# Patient Record
Sex: MALE | Race: WHITE | NOT HISPANIC OR LATINO | Employment: PART TIME | URBAN - METROPOLITAN AREA
[De-identification: names, ages, dates, MRNs, and addresses within clinical notes are randomized per-mention and may not be internally consistent; named-entity substitution may affect disease eponyms.]

---
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
## 2021-01-08 ENCOUNTER — TELEPHONE (OUTPATIENT)
Dept: PSYCHIATRY | Facility: CLINIC | Age: 21
End: 2021-01-08

## 2021-01-11 ENCOUNTER — TELEPHONE (OUTPATIENT)
Dept: PSYCHIATRY | Facility: CLINIC | Age: 21
End: 2021-01-11

## 2021-01-11 NOTE — TELEPHONE ENCOUNTER
Yvon Ochoa can you please reach out to patient for scheduling at [facility] location

## 2021-05-12 ENCOUNTER — TELEMEDICINE (OUTPATIENT)
Dept: FAMILY MEDICINE CLINIC | Facility: CLINIC | Age: 21
End: 2021-05-12
Payer: COMMERCIAL

## 2021-05-12 VITALS — BODY MASS INDEX: 41.84 KG/M2 | TEMPERATURE: 98.1 F | WEIGHT: 300 LBS

## 2021-05-12 DIAGNOSIS — Z20.822 ENCOUNTER BY TELEHEALTH FOR SUSPECTED COVID-19: ICD-10-CM

## 2021-05-12 DIAGNOSIS — Z20.822 ENCOUNTER BY TELEHEALTH FOR SUSPECTED COVID-19: Primary | ICD-10-CM

## 2021-05-12 LAB — SARS-COV-2 RNA RESP QL NAA+PROBE: NEGATIVE

## 2021-05-12 PROCEDURE — 3008F BODY MASS INDEX DOCD: CPT | Performed by: FAMILY MEDICINE

## 2021-05-12 PROCEDURE — U0005 INFEC AGEN DETEC AMPLI PROBE: HCPCS | Performed by: NURSE PRACTITIONER

## 2021-05-12 PROCEDURE — U0003 INFECTIOUS AGENT DETECTION BY NUCLEIC ACID (DNA OR RNA); SEVERE ACUTE RESPIRATORY SYNDROME CORONAVIRUS 2 (SARS-COV-2) (CORONAVIRUS DISEASE [COVID-19]), AMPLIFIED PROBE TECHNIQUE, MAKING USE OF HIGH THROUGHPUT TECHNOLOGIES AS DESCRIBED BY CMS-2020-01-R: HCPCS | Performed by: NURSE PRACTITIONER

## 2021-05-12 PROCEDURE — 99213 OFFICE O/P EST LOW 20 MIN: CPT | Performed by: NURSE PRACTITIONER

## 2021-05-12 PROCEDURE — 3725F SCREEN DEPRESSION PERFORMED: CPT | Performed by: NURSE PRACTITIONER

## 2021-05-12 NOTE — PROGRESS NOTES
COVID-19 Outpatient Progress Note    Assessment/Plan:    Problem List Items Addressed This Visit        Other    Encounter by telehealth for suspected COVID-19 - Primary     12May:  COVID testing ordered  Will follow-up with patient pending results of COVID test          Relevant Orders    Novel Coronavirus (COVID-19), PCR [facility] Collected at   [address] [address] or [facility]         Disposition:     I referred patient to one of our centralized sites for a COVID-19 swab  I have spent 15 minutes directly with the patient  Encounter provider HIREN Howell    Provider located at [address] [place] [address] PRIMARY CARE  [facility] [id number]    Recent Visits  No visits were found meeting these conditions  Showing recent visits within past 7 days and meeting all other requirements     Today's Visits  Date Type Provider Dept   05/12/21 Telemedicine Fanny Hallman 42 Primary Care   Showing today's visits and meeting all other requirements     Future Appointments  No visits were found meeting these conditions  Showing future appointments within next 150 days and meeting all other requirements      This virtual check-in was done via Lookery and patient was informed that this is a secure, HIPAA-compliant platform  He agrees to proceed  Patient agrees to participate in a virtual check in via telephone or video visit instead of presenting to the office to address urgent/immediate medical needs  Patient is aware this is a billable service  After connecting through [facility], the patient was identified by name and date of birth  Inmanmanjula Hernandez was informed that this was a telemedicine visit and that the exam was being conducted confidentially over secure lines  My office door was closed  No one else was in the room  Inman Mary acknowledged consent and understanding of privacy and security of the telemedicine visit   I informed the patient that I have reviewed his record in [address] [place] [address] and presented the opportunity for him to ask any questions regarding the visit today  The patient agreed to participate  Subjective:   Yazmin Tavares is a 24 y o  male who is concerned about COVID-19  Patient's symptoms include fatigue, malaise, nasal congestion and myalgias (shoulders and arms )  Patient denies fever, chills, rhinorrhea, sore throat, anosmia, loss of taste, cough, shortness of breath, chest tightness, abdominal pain, nausea, vomiting, diarrhea and headaches  Date of symptom onset: 5/9/2021    Exposure:   Contact with a person who is under investigation (PUI) for or who is positive for COVID-19 within the last 14 days?: No    Hospitalized recently for fever and/or lower respiratory symptoms?: No      Currently a healthcare worker that is involved in direct patient care?: No      Works in a special setting where the risk of COVID-19 transmission may be high? (this may include long-term care, correctional and long term facilities; homeless shelters; assisted-living facilities and group homes ): No      Resident in a special setting where the risk of COVID-19 transmission may be high? (this may include long-term care, correctional and long term facilities; homeless shelters; assisted-living facilities and group homes ): No      The patient is reporting symptoms of nasal congestion, myalgias, and increased fatigue  The patient states that he was sent home from work today due to his symptoms and his job would like him to be COVID tested before returning to work  Patient denies any fevers or shortness of breath  COVID testing ordered  Patient advised to continue to quarantine until he receives the results of his COVID test     Lab Results   Component Value Date    SARSCOV2 Negative 12/19/2020     History reviewed  No pertinent past medical history    Past Surgical History:   Procedure Laterality Date    WISDOM TOOTH EXTRACTION  2019     Current Outpatient Medications   Medication Sig Dispense Refill    famotidine (PEPCID) 20 mg tablet Take 1 tablet (20 mg total) by mouth daily 30 tablet 5     No current facility-administered medications for this visit  No Known Allergies    Review of Systems   Constitutional: Positive for fatigue  Negative for chills and fever  HENT: Positive for congestion  Negative for rhinorrhea and sore throat  Eyes: Negative  Respiratory: Negative for cough, chest tightness and shortness of breath  Cardiovascular: Negative  Gastrointestinal: Negative for abdominal pain, diarrhea, nausea and vomiting  Endocrine: Negative  Genitourinary: Negative  Musculoskeletal: Positive for myalgias (shoulders and arms )  Skin: Negative  Allergic/Immunologic: Negative  Neurological: Negative for headaches  Hematological: Negative  Psychiatric/Behavioral: Negative  Objective:    Vitals:    05/12/21 1054   Temp: 98 1 °F (36 7 °C)   TempSrc: Temporal   Weight: 136 kg (300 lb)       Physical Exam  Vitals reviewed: limited due to AmWell visit  Constitutional:       General: He is not in acute distress  Appearance: Normal appearance  He is not ill-appearing  Neurological:      Mental Status: He is alert  VIRTUAL VISIT DISCLAIMER    Vivek De La Cruz acknowledges that he has consented to an online visit or consultation  He understands that the online visit is based solely on information provided by him, and that, in the absence of a face-to-face physical evaluation by the physician, the diagnosis he receives is both limited and provisional in terms of accuracy and completeness  This is not intended to replace a full medical face-to-face evaluation by the physician  Vivek De La Cruz understands and accepts these terms

## 2021-05-12 NOTE — PATIENT INSTRUCTIONS
TENT COVID TESTING SITES FOR SURGICAL/PROCEDURAL PATIENTS     [facility] Location  Address  Hours   Monday-Friday Saturday Hours    [place] [address] [address], [place] [address]  8am-5pm  CLOSED    [place]  [address] [facility]  8am-5pm  CLOSED      [id number]    COVID-19 Home Care Guidelines    Your healthcare provider and/or public health staff have evaluated you and have determined that you do not need to remain in the hospital at this time  At this time you can be isolated at home where you will be monitored by staff from your local or state health department  You should carefully follow the prevention and isolation steps below until a healthcare provider or local or state health department says that you can return to your normal activities  Stay home except to get medical care    People who are mildly ill with COVID-19 are able to isolate at home during their illness  You should restrict activities outside your home, except for getting medical care  Do not go to work, school, or public areas  Avoid using public transportation, ride-sharing, or taxis  Separate yourself from other people and animals in your home    People: As much as possible, you should stay in a specific room and away from other people in your home  Also, you should use a separate bathroom, if available  Animals: You should restrict contact with pets and other animals while you are sick with COVID-19, just like you would around other people  Although there have not been reports of pets or other animals becoming sick with COVID-19, it is still recommended that people sick with COVID-19 limit contact with animals until more information is known about the virus  When possible, have another member of your household care for your animals while you are sick  If you are sick with COVID-19, avoid contact with your pet, including petting, snuggling, being kissed or licked, and sharing food   If you must care for your pet or be around animals while you are sick, wash your hands before and after you interact with pets and wear a facemask  See COVID-19 and Animals for more information  Call ahead before visiting your doctor    If you have a medical appointment, call the healthcare provider and tell them that you have or may have COVID-19  This will help the healthcare providers office take steps to keep other people from getting infected or exposed  Wear a facemask    You should wear a facemask when you are around other people (e g , sharing a room or vehicle) or pets and before you enter a healthcare providers office  If you are not able to wear a facemask (for example, because it causes trouble breathing), then people who live with you should not stay in the same room with you, or they should wear a facemask if they enter your room  Cover your coughs and sneezes    Cover your mouth and nose with a tissue when you cough or sneeze  Throw used tissues in a lined trash can  Immediately wash your hands with soap and water for at least 20 seconds or, if soap and water are not available, clean your hands with an alcohol-based hand  that contains at least 60% alcohol  Clean your hands often    Wash your hands often with soap and water for at least 20 seconds, especially after blowing your nose, coughing, or sneezing; going to the bathroom; and before eating or preparing food  If soap and water are not readily available, use an alcohol-based hand  with at least 60% alcohol, covering all surfaces of your hands and rubbing them together until they feel dry  Soap and water are the best option if hands are visibly dirty  Avoid touching your eyes, nose, and mouth with unwashed hands  Avoid sharing personal household items    You should not share dishes, drinking glasses, cups, eating utensils, towels, or bedding with other people or pets in your home   After using these items, they should be washed thoroughly with soap and water  Clean all high-touch surfaces everyday    High touch surfaces include counters, tabletops, doorknobs, bathroom fixtures, toilets, phones, keyboards, tablets, and bedside tables  Also, clean any surfaces that may have blood, stool, or body fluids on them  Use a household cleaning spray or wipe, according to the label instructions  Labels contain instructions for safe and effective use of the cleaning product including precautions you should take when applying the product, such as wearing gloves and making sure you have good ventilation during use of the product  Monitor your symptoms    Seek prompt medical attention if your illness is worsening (e g , difficulty breathing)  Before seeking care, call your healthcare provider and tell them that you have, or are being evaluated for, COVID-19  Put on a facemask before you enter the facility  These steps will help the healthcare providers office to keep other people in the office or waiting room from getting infected or exposed  Ask your healthcare provider to call the local or [facility] health department  Persons who are placed under active monitoring or facilitated self-monitoring should follow instructions provided by their local health department or occupational health professionals, as appropriate  If you have a medical emergency and need to call 911, notify the dispatch personnel that you have, or are being evaluated for COVID-19  If possible, put on a facemask before emergency medical services arrive      Discontinuing home isolation    Patients with confirmed COVID-19 should remain under home isolation precautions until the following conditions are met:   - They have had no fever for at least 24 hours (that is one full day of no fever without the use medicine that reduces fevers)  AND  - other symptoms have improved (for example, when their cough or shortness of breath have improved)  AND  - If had mild or moderate illness, at least 10 days have passed since their symptoms first appeared or if severe illness (needed oxygen) or immunosuppressed, at least 20 days have passed since symptoms first appeared  Patients with confirmed COVID-19 should also notify close contacts (including their workplace) and ask that they self-quarantine  Currently, close contact is defined as being within 6 feet for 15 minutes or more from the period 24 hours starting 48 hours before symptom onset to the time at which the patient went into isolation  Close contacts of patients diagnosed with COVID-19 should be instructed by the patient to self-quarantine for 14 days from the last time of their last contact with the patient  Source: epicurio     Problem List Items Addressed This Visit        Other    Encounter by telehealth for suspected COVID-19 - Primary     12May:  COVID testing ordered    Will follow-up with patient pending results of COVID test          Relevant Orders    Novel Coronavirus (COVID-19), PCR [facility] Collected at   [facility] [address] or [facility]

## 2021-06-01 ENCOUNTER — TELEMEDICINE (OUTPATIENT)
Dept: FAMILY MEDICINE CLINIC | Facility: CLINIC | Age: 21
End: 2021-06-01
Payer: COMMERCIAL

## 2021-06-01 VITALS — TEMPERATURE: 97.7 F | WEIGHT: 295 LBS | BODY MASS INDEX: 41.3 KG/M2 | HEIGHT: 71 IN

## 2021-06-01 DIAGNOSIS — R11.0 NAUSEA: Primary | ICD-10-CM

## 2021-06-01 PROCEDURE — 1036F TOBACCO NON-USER: CPT | Performed by: FAMILY MEDICINE

## 2021-06-01 PROCEDURE — 3008F BODY MASS INDEX DOCD: CPT | Performed by: FAMILY MEDICINE

## 2021-06-01 PROCEDURE — 99213 OFFICE O/P EST LOW 20 MIN: CPT | Performed by: FAMILY MEDICINE

## 2021-06-01 NOTE — PROGRESS NOTES
COVID-19 Outpatient Progress Note    Assessment/Plan:    Problem List Items Addressed This Visit     Nausea - Primary     Some nausea over weekend  Should have not gone to work on Saturday, but with low suspicion for COVID, OK to RTW tomorrow  Disposition:     After clarifying the patient's history, my suspicion for COVID-19 infection is very low  I have spent 15 minutes directly with the patient  Encounter provider Donovan Petersen MD    Provider located at [address] [place] [address] PRIMARY CARE  [address] [place] [address]    Recent Visits  No visits were found meeting these conditions  Showing recent visits within past 7 days and meeting all other requirements     Today's Visits  Date Type Provider Dept   06/01/21 Telemedicine Donovan Petersen MD [facility] Primary Care   Showing today's visits and meeting all other requirements     Future Appointments  No visits were found meeting these conditions  Showing future appointments within next 150 days and meeting all other requirements      This virtual check-in was done via Repairy and patient was informed that this is a secure, HIPAA-compliant platform  He agrees to proceed  Patient agrees to participate in a virtual check in via telephone or video visit instead of presenting to the office to address urgent/immediate medical needs  Patient is aware this is a billable service  After connecting through [facility], the patient was identified by name and date of birth  Sharon Martines was informed that this was a telemedicine visit and that the exam was being conducted confidentially over secure lines  My office door was closed  No one else was in the room  Sharon Martines acknowledged consent and understanding of privacy and security of the telemedicine visit  I informed the patient that I have reviewed his record in Epic and presented the opportunity for him to ask any questions regarding the visit today  The patient agreed to participate  Subjective:   Mo Boogie is a 22 y o  male who is concerned about COVID-19  Patient's symptoms include nausea and vomiting  Patient denies fever, chills, fatigue, malaise, congestion, rhinorrhea, sore throat, anosmia, loss of taste, cough, shortness of breath, chest tightness, abdominal pain, diarrhea, myalgias and headaches  Date of symptom onset: 5/29/2021    Exposure:   Contact with a person who is under investigation (PUI) for or who is positive for COVID-19 within the last 14 days?: No    Hospitalized recently for fever and/or lower respiratory symptoms?: No      Currently a healthcare worker that is involved in direct patient care?: No      Works in a special setting where the risk of COVID-19 transmission may be high? (this may include long-term care, correctional and retirement facilities; homeless shelters; assisted-living facilities and group homes ): No      Resident in a special setting where the risk of COVID-19 transmission may be high? (this may include long-term care, correctional and retirement facilities; homeless shelters; assisted-living facilities and group homes ): No      Patient seen to [facility] for N/V  He was not able to get to work due to this  Was just nausea and some vomiting  He was out Saturday due to these symptoms on Saturday  Was OK on Sunday  Lab Results   Component Value Date    SARSCOV2 Negative 05/12/2021    SARSCOV2 Negative 12/19/2020     No past medical history on file  Past Surgical History:   Procedure Laterality Date    WISDOM TOOTH EXTRACTION  2019     Current Outpatient Medications   Medication Sig Dispense Refill    famotidine (PEPCID) 20 mg tablet Take 1 tablet (20 mg total) by mouth daily 30 tablet 5     No current facility-administered medications for this visit  No Known Allergies    Review of Systems   Constitutional: Negative for chills, fatigue and fever     HENT: Negative for congestion, rhinorrhea and sore throat  Respiratory: Negative for cough, chest tightness and shortness of breath  Gastrointestinal: Positive for nausea and vomiting  Negative for abdominal pain and diarrhea  Musculoskeletal: Negative for myalgias  Neurological: Negative for headaches  Objective:    Vitals:    06/01/21 1454   Temp: 97 7 °F (36 5 °C)   TempSrc: Temporal   Weight: 134 kg (295 lb)   Height: 5' 11" (1 803 m)       Physical Exam  Nursing note reviewed  Constitutional:       Appearance: He is well-developed  HENT:      Head: Normocephalic and atraumatic  Pulmonary:      Effort: Pulmonary effort is normal       Breath sounds: Normal breath sounds  VIRTUAL VISIT DISCLAIMER    Shelby Kaiser acknowledges that he has consented to an online visit or consultation  He understands that the online visit is based solely on information provided by him, and that, in the absence of a face-to-face physical evaluation by the physician, the diagnosis he receives is both limited and provisional in terms of accuracy and completeness  This is not intended to replace a full medical face-to-face evaluation by the physician  Shelby Kaiser understands and accepts these terms

## 2021-06-01 NOTE — PATIENT INSTRUCTIONS
Problem List Items Addressed This Visit     Nausea - Primary     Some nausea over weekend  Should have not gone to work on Saturday, but with low suspicion for COVID, OK to RTW tomorrow  COVID 19 Instructions    Diego Barrett was advised to limit contact with others to essential tasks such as getting food, medications, and medical care  Proper handwashing reviewed, and Hand sanitzer when washing is not available  If the patient develops symptoms of COVID 19, the patient should call the office as soon as possible  For 3273-8353 Flu season, it is strongly recommended that Flu Vaccinations be obtained  Virtual Visits may be conducted in several ways: AmWell: You should get a text message when the provider is ready to see you  Click on the link in the text message, and the call should start  You will need to type in your name, and allow camera and microphone access  This is HIPPA compliant, and secure  Please try to download Google Duo  Once you do download this on your phone, you will be prompted to add your phone number to the account  After that, he should receive a text from [employer], and use that code to verify your phone number  After that, you should be able to use Google Duo to receive and make video calls  Please download Microsoft Teams to your phone or computer  You would get an email with the meeting after scheduling with the office  You will Join the meeting, and wait there till the provider joins as well  Instructions for downloading this are available from the office  This is HIPPA compliant, and secure  We are committed to getting you vaccinated as soon as possible and will be closely following CDC and [facility]  guidelines as they are released and revised    Please refer to our COVID-19 vaccine webpage for the most up to date information on the vaccine and our distribution elmer burris    OUR NEW LOCATION:  Starting around 28June2021, our new location and phone are:    [address] [facility] [address], [address] [place] [address], [address]  Fax: [phone]    Lab services and OB/GYN will be at this location as well

## 2021-06-01 NOTE — ASSESSMENT & PLAN NOTE
Some nausea over weekend  Should have not gone to work on Saturday, but with low suspicion for COVID, OK to RTW tomorrow

## 2021-06-01 NOTE — LETTER
June 1, 2021     Patient: Liz Blackwood   YOB: 2000   Date of Visit: 6/1/2021       To Whom it May Concern:    Kirk Tovar is under my professional care  He was seen in my office on 6/1/2021  He may return to work on [id number]  Not likely COVID, and symptoms have resolved at this time  If you have any questions or concerns, please don't hesitate to call           Sincerely,          Madison Orozco MD        CC: No Recipients

## 2021-08-11 ENCOUNTER — TELEMEDICINE (OUTPATIENT)
Dept: FAMILY MEDICINE CLINIC | Facility: CLINIC | Age: 21
End: 2021-08-11
Payer: COMMERCIAL

## 2021-08-11 VITALS — BODY MASS INDEX: 40.6 KG/M2 | TEMPERATURE: 98.4 F | WEIGHT: 290 LBS | HEIGHT: 71 IN

## 2021-08-11 DIAGNOSIS — R11.0 NAUSEA: Primary | ICD-10-CM

## 2021-08-11 PROCEDURE — 3008F BODY MASS INDEX DOCD: CPT | Performed by: FAMILY MEDICINE

## 2021-08-11 PROCEDURE — 99213 OFFICE O/P EST LOW 20 MIN: CPT | Performed by: FAMILY MEDICINE

## 2021-08-11 PROCEDURE — 1036F TOBACCO NON-USER: CPT | Performed by: FAMILY MEDICINE

## 2021-08-11 NOTE — PATIENT INSTRUCTIONS
Problem List Items Addressed This Visit     Nausea - Primary     Continues with discomfort  Can not fully eval over video  Seems OK for work as this has been a longer standing issue and he has had COVID testing before  Check US and labs  Consider GI as well  COVID 19 Instructions    Yesy Moctezuma was advised to limit contact with others to essential tasks such as getting food, medications, and medical care  Proper handwashing reviewed, and Hand sanitzer when washing is not available  If the patient develops symptoms of COVID 19, the patient should call the office as soon as possible  For 1273-5344 Flu season, it is strongly recommended that Flu Vaccinations be obtained  Virtual Visits may be conducted in several ways: Kilo: You should get a text message when the provider is ready to see you  Click on the link in the text message, and the call should start  You will need to type in your name, and allow camera and microphone access  This is HIPPA compliant, and secure  Please try to download Google Duo  Once you do download this on your phone, you will be prompted to add your phone number to the account  After that, he should receive a text from [employer], and use that code to verify your phone number  After that, you should be able to use Google Duo to receive and make video calls  Please download Microsoft Teams to your phone or computer  You would get an email with the meeting after scheduling with the office  You will Join the meeting, and wait there till the provider joins as well  Instructions for downloading this are available from the office  This is HIPPA compliant, and secure  We are committed to getting you vaccinated as soon as possible and will be closely following CDC and [facility]  guidelines as they are released and revised    Please refer to our COVID-19 vaccine webpage for the most up to date information on the vaccine and our distribution efforts  Raeann burris    OUR NEW LOCATION:  Starting around 28June2021, our new location and phone are:    [address], [address] [place], [place], [address]  Fax: [phone]    Lab services and OB/GYN will be at this location as well  Recommend COVID vaccine

## 2021-08-11 NOTE — PROGRESS NOTES
Virtual Regular Visit    Verification of patient location:    Patient is located in the following state in which I hold an active license [place]      Assessment/Plan:    Problem List Items Addressed This Visit     Nausea - Primary     Continues with discomfort  Can not fully eval over video  Seems OK for work as this has been a longer standing issue and he has had COVID testing before  Check US and labs  Consider GI as well  If symptoms get worse at all, would recommend emergency room         Relevant Orders    CBC and differential    Comprehensive metabolic panel    US abdomen complete               Reason for visit is   Chief Complaint   Patient presents with    Nausea     Pt has ongoing " morning sickness"  He states he always feels nauseated in the morning and he was told by his boss he needs a note from the Doc to go back to work   Nasal Congestion     Pt states he also has his normal allergy symptoms of a runny nose which is routine   Virtual Regular Visit        Encounter provider Richelle Carmona MD    Provider located at [address] [address] [phone]      Recent Visits  No visits were found meeting these conditions  Showing recent visits within past 7 days and meeting all other requirements  Today's Visits  Date Type Provider Dept   08/11/21 Telemedicine Richelle Carmona MD [facility]   Showing today's visits and meeting all other requirements  Future Appointments  No visits were found meeting these conditions  Showing future appointments within next 150 days and meeting all other requirements       The patient was identified by name and date of birth  Loida Shaista was informed that this is a telemedicine visit and that the visit is being conducted through [address] [place] [address] Now and patient was informed that this is a secure, HIPAA-compliant platform  He agrees to proceed     My office door was closed   No one else was in the room  He acknowledged consent and understanding of privacy and security of the video platform  The patient has agreed to participate and understands they can discontinue the visit at any time  Patient is aware this is a billable service  Deyanira Quevedo is a 22 y o  male   Chief Complaint   Patient presents with    Nausea     Pt has ongoing " morning sickness"  He states he always feels nauseated in the morning and he was told by his boss he needs a note from the Doc to go back to work   Nasal Congestion     Pt states he also has his normal allergy symptoms of a runny nose which is routine   Virtual Regular Visit        Significant nausea in AM   Has been having nausea for a while now  Reviewed chart  Nausea in AM   Some abdominal pains with this  No diarrhea with this  Has been on and off for years  Bad in AM since Jan 2021  History reviewed  No pertinent past medical history  Past Surgical History:   Procedure Laterality Date    WISDOM TOOTH EXTRACTION  2019       Current Outpatient Medications   Medication Sig Dispense Refill    famotidine (PEPCID) 20 mg tablet Take 1 tablet (20 mg total) by mouth daily 30 tablet 5     No current facility-administered medications for this visit  No Known Allergies    Review of Systems   Constitutional: Negative  HENT: Negative  Respiratory: Negative  Cardiovascular: Negative  Gastrointestinal: Positive for abdominal pain and nausea  Musculoskeletal: Negative  Video Exam    Vitals:    08/11/21 1230   Temp: 98 4 °F (36 9 °C)   Weight: 132 kg (290 lb)   Height: 5' 11" (1 803 m)       Physical Exam  Vitals and nursing note reviewed  Constitutional:       General: He is not in acute distress  Appearance: Normal appearance  He is well-developed  HENT:      Head: Normocephalic and atraumatic     Pulmonary:      Effort: Pulmonary effort is normal       Breath sounds: Normal breath sounds  Neurological:      Mental Status: He is alert  I spent 20 minutes directly with the patient during this visit    VIRTUAL VISIT DISCLAIMER      Rangeleydouglas Liao verbally agrees to participate in [employer]  Pt is aware that [employer] could be limited without vital signs or the ability to perform a full hands-on physical Candice Gaston understands he or the provider may request at any time to terminate the video visit and request the patient to seek care or treatment in person

## 2021-08-11 NOTE — ASSESSMENT & PLAN NOTE
Continues with discomfort  Can not fully eval over video  Seems OK for work as this has been a longer standing issue and he has had COVID testing before  Check US and labs  Consider GI as well      If symptoms get worse at all, would recommend emergency room

## 2021-08-11 NOTE — LETTER
August 11, 2021     Patient: Jyoti Shaw   YOB: 2000   Date of Visit: 8/11/2021       To Whom it May Concern:    Karey Oppenheim is under my professional care  He was seen in my office on 8/11/2021  He may return to work on 12Aug2021  If you have any questions or concerns, please don't hesitate to call           Sincerely,          Nadeen Colindres MD        CC: No Recipients

## 2021-08-25 ENCOUNTER — HOSPITAL ENCOUNTER (OUTPATIENT)
Dept: ULTRASOUND IMAGING | Facility: HOSPITAL | Age: 21
Discharge: HOME | End: 2021-08-25
Payer: COMMERCIAL

## 2021-08-25 DIAGNOSIS — R11.0 NAUSEA: ICD-10-CM

## 2021-08-25 PROCEDURE — 76700 US EXAM ABDOM COMPLETE: CPT

## 2021-08-26 PROBLEM — K76.0 FATTY LIVER: Status: ACTIVE | Noted: 2021-08-26

## 2021-08-26 PROBLEM — K76.89 LIVER NODULE: Status: ACTIVE | Noted: 2021-08-26

## 2021-09-07 ENCOUNTER — OFFICE VISIT (OUTPATIENT)
Dept: FAMILY MEDICINE CLINIC | Facility: CLINIC | Age: 21
End: 2021-09-07
Payer: COMMERCIAL

## 2021-09-07 VITALS
TEMPERATURE: 98.3 F | WEIGHT: 289.38 LBS | DIASTOLIC BLOOD PRESSURE: 72 MMHG | HEIGHT: 71 IN | SYSTOLIC BLOOD PRESSURE: 110 MMHG | BODY MASS INDEX: 40.51 KG/M2

## 2021-09-07 DIAGNOSIS — K76.89 LIVER NODULE: ICD-10-CM

## 2021-09-07 DIAGNOSIS — Z11.4 SCREENING FOR HIV (HUMAN IMMUNODEFICIENCY VIRUS): Primary | ICD-10-CM

## 2021-09-07 DIAGNOSIS — K76.0 FATTY LIVER: ICD-10-CM

## 2021-09-07 DIAGNOSIS — Z11.59 ENCOUNTER FOR HEPATITIS C SCREENING TEST FOR LOW RISK PATIENT: ICD-10-CM

## 2021-09-07 DIAGNOSIS — R11.0 NAUSEA: ICD-10-CM

## 2021-09-07 PROBLEM — Z20.822 ENCOUNTER BY TELEHEALTH FOR SUSPECTED COVID-19: Status: RESOLVED | Noted: 2021-05-12 | Resolved: 2021-09-07

## 2021-09-07 PROCEDURE — 3008F BODY MASS INDEX DOCD: CPT | Performed by: FAMILY MEDICINE

## 2021-09-07 PROCEDURE — 99213 OFFICE O/P EST LOW 20 MIN: CPT | Performed by: FAMILY MEDICINE

## 2021-09-07 PROCEDURE — 1036F TOBACCO NON-USER: CPT | Performed by: FAMILY MEDICINE

## 2021-09-07 RX ORDER — PANTOPRAZOLE SODIUM 40 MG/1
40 TABLET, DELAYED RELEASE ORAL
Qty: 30 TABLET | Refills: 5 | Status: SHIPPED | OUTPATIENT
Start: 2021-09-07

## 2021-09-07 NOTE — PATIENT INSTRUCTIONS
Problem List Items Addressed This Visit     Fatty liver     Ultrasound appears to show that there is fatty liver  I would recommend follow-up with GI, as there was also some discussion about possible liver nodule, but he also has the longstanding nausea  It is more for the nausea issue that he would see GI  Relevant Medications    pantoprazole (PROTONIX) 40 mg tablet    Other Relevant Orders    Ambulatory referral to Gastroenterology    Liver nodule     Reviewed ultrasound  Could possibly be problematic, but they do feel that this is likely focal fat sparing  Repeat ultrasound versus MRI was recommended by Radiology, and on further discussion we agreed that ultrasound be reasonable  Relevant Medications    pantoprazole (PROTONIX) 40 mg tablet    Other Relevant Orders    Ambulatory referral to Gastroenterology    Nausea     Significant symptoms with nausea, especially in the morning  Currently on Pepcid 20 mg, but does not take it all the time  He was not sure that it helped  Would recommend trial of pantoprazole, 40 mg daily  I would also recommend GI  Relevant Medications    pantoprazole (PROTONIX) 40 mg tablet    Other Relevant Orders    Ambulatory referral to Gastroenterology      Other Visit Diagnoses     Screening for HIV (human immunodeficiency virus)    -  Primary    Relevant Orders    Hepatitis C antibody    HIV 1/2 Antigen/Antibody (4th Generation) w Reflex [facility]    Encounter for hepatitis C screening test for low risk patient        Relevant Orders    Hepatitis C antibody          COVID 19 Instructions    Rickie Rasmussen was advised to limit contact with others to essential tasks such as getting food, medications, and medical care  Proper handwashing reviewed, and Hand sanitzer when washing is not available  If the patient develops symptoms of COVID 19, the patient should call the office as soon as possible      For 8593-0770 Flu season, it is strongly recommended that Flu Vaccinations be obtained  Virtual Visits:  Kilo: This works on smart phones (any phone with Internet browsing capability)  You should get a text message when the provider is ready to see you  Click on the link in the text message, and the call should start  You will need to type in your name, and allow camera and microphone access  This is HIPPA compliant, and secure  If you have not already done so, get immunized to COVID 19  We are committed to getting you vaccinated as soon as possible and will be closely following CDC and [facility]  guidelines as they are released and revised  Please refer to our COVID-19 vaccine webpage for the most up to date information on the vaccine and our distribution efforts  This site will also have the most up to date recommendations for COVID booster vaccine  Raeann burris    OUR NEW LOCATION:    [address] [place], [place], [address]  Fax: [phone]    Lab services and OB/GYN are at this location as well

## 2021-09-07 NOTE — PROGRESS NOTES
Assessment and Plan:    Problem List Items Addressed This Visit     Fatty liver     Ultrasound appears to show that there is fatty liver  I would recommend follow-up with GI, as there was also some discussion about possible liver nodule, but he also has the longstanding nausea  It is more for the nausea issue that he would see GI  Relevant Medications    pantoprazole (PROTONIX) 40 mg tablet    Other Relevant Orders    Ambulatory referral to Gastroenterology    Liver nodule     Reviewed ultrasound  Could possibly be problematic, but they do feel that this is likely focal fat sparing  Repeat ultrasound versus MRI was recommended by Radiology, and on further discussion we agreed that ultrasound be reasonable  Relevant Medications    pantoprazole (PROTONIX) 40 mg tablet    Other Relevant Orders    Ambulatory referral to Gastroenterology    Nausea     Significant symptoms with nausea, especially in the morning  Currently on Pepcid 20 mg, but does not take it all the time  He was not sure that it helped  Would recommend trial of pantoprazole, 40 mg daily  I would also recommend GI  Relevant Medications    pantoprazole (PROTONIX) 40 mg tablet    Other Relevant Orders    Ambulatory referral to Gastroenterology      Other Visit Diagnoses     Screening for HIV (human immunodeficiency virus)    -  Primary    Relevant Orders    Hepatitis C antibody    HIV 1/2 Antigen/Antibody (4th Generation) w Reflex [facility]    Encounter for hepatitis C screening test for low risk patient        Relevant Orders    Hepatitis C antibody                 Diagnoses and all orders for this visit:    Screening for HIV (human immunodeficiency virus)  -     Hepatitis C antibody; Future  -     HIV 1/2 Antigen/Antibody (4th Generation) w Reflex [facility]; Future    Encounter for hepatitis C screening test for low risk patient  -     Hepatitis C antibody;  Future    Nausea  -     Ambulatory referral to Gastroenterology; Future  -     pantoprazole (PROTONIX) 40 mg tablet; Take 1 tablet (40 mg total) by mouth daily before breakfast    Liver nodule  -     Ambulatory referral to Gastroenterology; Future  -     pantoprazole (PROTONIX) 40 mg tablet; Take 1 tablet (40 mg total) by mouth daily before breakfast    Fatty liver  -     Ambulatory referral to Gastroenterology; Future  -     pantoprazole (PROTONIX) 40 mg tablet; Take 1 tablet (40 mg total) by mouth daily before breakfast              Subjective:      Patient ID: Melvin Wisdom is a 22 y o  male  CC:    No chief complaint on file  HPI:    Patient is here to follow-up after his last visit  Labs reviewed  Ultrasound reviewed  He continued to feel "ill almost all day  When he gets up, has nasea and problems  He has not been able to go to work due to this for a while now  Goes to work, but someone there looks at him and says to go due to feeling poorly  Mom reports reflux was bad as an infant/child  Patient repoorts the symptoms are worse over the last year  The following portions of the patient's history were reviewed and updated as appropriate: allergies, current medications, past family history, past medical history, past social history, past surgical history and problem list       Review of Systems   Constitutional: Negative  HENT: Negative  Respiratory: Negative  Cardiovascular: Negative  Gastrointestinal: Positive for nausea  Genitourinary: Negative  Musculoskeletal: Negative  All other systems reviewed and are negative  Data to review:       Objective:    Vitals:    09/07/21 1540   BP: 110/72   BP Location: Right arm   Patient Position: Sitting   Cuff Size: Large   Temp: 98 3 °F (36 8 °C)   TempSrc: Temporal   Weight: 131 kg (289 lb 6 oz)   Height: 5' 11" (1 803 m)        Physical Exam  Vitals and nursing note reviewed  Constitutional:       Appearance: Normal appearance     Neck:      Vascular: No carotid bruit  Cardiovascular:      Rate and Rhythm: Normal rate and regular rhythm  Pulses: Normal pulses  Carotid pulses are 2+ on the right side and 2+ on the left side  Heart sounds: Normal heart sounds  No murmur heard  No gallop  Pulmonary:      Effort: Pulmonary effort is normal  No respiratory distress  Breath sounds: Normal breath sounds  No stridor  No wheezing, rhonchi or rales  Chest:      Chest wall: No tenderness  Abdominal:      General: Abdomen is flat  There is no distension  Palpations: Abdomen is soft  There is no shifting dullness, hepatomegaly, splenomegaly, mass or pulsatile mass  Tenderness: There is abdominal tenderness in the right upper quadrant  There is no right CVA tenderness, left CVA tenderness, guarding or rebound  Negative signs include Paula's sign, Rovsing's sign, McBurney's sign and psoas sign  Hernia: There is no hernia in the umbilical area or ventral area  Neurological:      Mental Status: He is alert

## 2021-09-07 NOTE — ASSESSMENT & PLAN NOTE
Significant symptoms with nausea, especially in the morning  Currently on Pepcid 20 mg, but does not take it all the time  He was not sure that it helped  Would recommend trial of pantoprazole, 40 mg daily  I would also recommend GI

## 2021-09-07 NOTE — ASSESSMENT & PLAN NOTE
Reviewed ultrasound  Could possibly be problematic, but they do feel that this is likely focal fat sparing  Repeat ultrasound versus MRI was recommended by Radiology, and on further discussion we agreed that ultrasound be reasonable

## 2021-09-07 NOTE — ASSESSMENT & PLAN NOTE
Ultrasound appears to show that there is fatty liver  I would recommend follow-up with GI, as there was also some discussion about possible liver nodule, but he also has the longstanding nausea  It is more for the nausea issue that he would see GI

## 2021-09-09 ENCOUNTER — LAB (OUTPATIENT)
Dept: LAB | Facility: MEDICAL CENTER | Age: 21
End: 2021-09-09
Payer: COMMERCIAL

## 2021-09-09 DIAGNOSIS — Z11.59 ENCOUNTER FOR HEPATITIS C SCREENING TEST FOR LOW RISK PATIENT: ICD-10-CM

## 2021-09-09 DIAGNOSIS — R11.0 NAUSEA: ICD-10-CM

## 2021-09-09 DIAGNOSIS — Z11.4 SCREENING FOR HIV (HUMAN IMMUNODEFICIENCY VIRUS): ICD-10-CM

## 2021-09-09 LAB
ALBUMIN SERPL BCP-MCNC: 3.9 G/DL (ref 3.5–5)
ALP SERPL-CCNC: 76 U/L (ref 46–116)
ALT SERPL W P-5'-P-CCNC: 28 U/L (ref 12–78)
ANION GAP SERPL CALCULATED.3IONS-SCNC: 4 MMOL/L (ref 4–13)
AST SERPL W P-5'-P-CCNC: 11 U/L (ref 5–45)
BASOPHILS # BLD AUTO: 0.04 THOUSANDS/ΜL (ref 0–0.1)
BASOPHILS NFR BLD AUTO: 1 % (ref 0–1)
BILIRUB SERPL-MCNC: 0.64 MG/DL (ref 0.2–1)
BUN SERPL-MCNC: 10 MG/DL (ref 5–25)
CALCIUM SERPL-MCNC: 9.3 MG/DL (ref 8.3–10.1)
CHLORIDE SERPL-SCNC: 106 MMOL/L (ref 100–108)
CO2 SERPL-SCNC: 28 MMOL/L (ref 21–32)
CREAT SERPL-MCNC: 0.64 MG/DL (ref 0.6–1.3)
EOSINOPHIL # BLD AUTO: 0.12 THOUSAND/ΜL (ref 0–0.61)
EOSINOPHIL NFR BLD AUTO: 1 % (ref 0–6)
ERYTHROCYTE [DISTWIDTH] IN BLOOD BY AUTOMATED COUNT: 12.9 % (ref 11.6–15.1)
GFR SERPL CREATININE-BSD FRML MDRD: 141 ML/MIN/1.73SQ M
GLUCOSE P FAST SERPL-MCNC: 93 MG/DL (ref 65–99)
HCT VFR BLD AUTO: 43.2 % (ref 36.5–49.3)
HCV AB SER QL: NORMAL
HGB BLD-MCNC: 13.8 G/DL (ref 12–17)
IMM GRANULOCYTES # BLD AUTO: 0.02 THOUSAND/UL (ref 0–0.2)
IMM GRANULOCYTES NFR BLD AUTO: 0 % (ref 0–2)
LYMPHOCYTES # BLD AUTO: 2.66 THOUSANDS/ΜL (ref 0.6–4.47)
LYMPHOCYTES NFR BLD AUTO: 30 % (ref 14–44)
MCH RBC QN AUTO: 28.3 PG (ref 26.8–34.3)
MCHC RBC AUTO-ENTMCNC: 31.9 G/DL (ref 31.4–37.4)
MCV RBC AUTO: 89 FL (ref 82–98)
MONOCYTES # BLD AUTO: 0.68 THOUSAND/ΜL (ref 0.17–1.22)
MONOCYTES NFR BLD AUTO: 8 % (ref 4–12)
NEUTROPHILS # BLD AUTO: 5.25 THOUSANDS/ΜL (ref 1.85–7.62)
NEUTS SEG NFR BLD AUTO: 60 % (ref 43–75)
NRBC BLD AUTO-RTO: 0 /100 WBCS
PLATELET # BLD AUTO: 291 THOUSANDS/UL (ref 149–390)
PMV BLD AUTO: 11.3 FL (ref 8.9–12.7)
POTASSIUM SERPL-SCNC: 4 MMOL/L (ref 3.5–5.3)
PROT SERPL-MCNC: 7.9 G/DL (ref 6.4–8.2)
RBC # BLD AUTO: 4.88 MILLION/UL (ref 3.88–5.62)
SODIUM SERPL-SCNC: 138 MMOL/L (ref 136–145)
WBC # BLD AUTO: 8.77 THOUSAND/UL (ref 4.31–10.16)

## 2021-09-09 PROCEDURE — 86803 HEPATITIS C AB TEST: CPT

## 2021-09-09 PROCEDURE — 85025 COMPLETE CBC W/AUTO DIFF WBC: CPT

## 2021-09-09 PROCEDURE — 80053 COMPREHEN METABOLIC PANEL: CPT

## 2021-09-09 PROCEDURE — 36415 COLL VENOUS BLD VENIPUNCTURE: CPT

## 2021-11-17 ENCOUNTER — TELEPHONE (OUTPATIENT)
Dept: PSYCHIATRY | Facility: CLINIC | Age: 21
End: 2021-11-17

## 2021-11-28 ENCOUNTER — HOSPITAL ENCOUNTER (OUTPATIENT)
Dept: ULTRASOUND IMAGING | Facility: HOSPITAL | Age: 21
Discharge: HOME | End: 2021-11-28
Payer: COMMERCIAL

## 2021-11-28 DIAGNOSIS — K76.0 FATTY LIVER: ICD-10-CM

## 2021-11-28 DIAGNOSIS — K76.89 LIVER NODULE: ICD-10-CM

## 2021-11-28 PROCEDURE — 76700 US EXAM ABDOM COMPLETE: CPT

## 2021-12-08 ENCOUNTER — RA CDI HCC (OUTPATIENT)
Dept: OTHER | Facility: HOSPITAL | Age: 21
End: 2021-12-08

## 2022-01-10 ENCOUNTER — RA CDI HCC (OUTPATIENT)
Dept: OTHER | Facility: HOSPITAL | Age: 22
End: 2022-01-10

## 2022-01-10 NOTE — PROGRESS NOTES
[facility]  coding opportunities             Chart Reviewed * (Number of) Inbasket suggestions sent to Provider: 1            Number of suggestions used: 0      Number of suggestions NOT actually used: 1     Patients insurance company: [employer] (Medicare and Commercial for [employer] and [employer])     Visit status: Patient arrived for their scheduled appointment        [facility]  coding opportunities             Chart Reviewed * (Number of) Inbasket suggestions sent to Provider: 1      E66 01 Morbid (severe) obesity due to excess calories ([facility] )   *Per CMS/ICD 10 coding guidelines, to be used when BMI > 35 & <40 with one or more comorbidity      If this is correct, please document and assess at your next visit,1/17/2022               Patients insurance company: [employer] (Medicare and Commercial for [employer] and [employer])

## 2022-01-12 ENCOUNTER — TELEPHONE (OUTPATIENT)
Dept: PSYCHIATRY | Facility: CLINIC | Age: 22
End: 2022-01-12

## 2022-01-17 ENCOUNTER — APPOINTMENT (OUTPATIENT)
Dept: RADIOLOGY | Facility: MEDICAL CENTER | Age: 22
End: 2022-01-17
Payer: COMMERCIAL

## 2022-01-17 ENCOUNTER — OFFICE VISIT (OUTPATIENT)
Dept: FAMILY MEDICINE CLINIC | Facility: CLINIC | Age: 22
End: 2022-01-17
Payer: COMMERCIAL

## 2022-01-17 VITALS
DIASTOLIC BLOOD PRESSURE: 70 MMHG | WEIGHT: 273 LBS | HEIGHT: 71 IN | TEMPERATURE: 97.5 F | HEART RATE: 106 BPM | SYSTOLIC BLOOD PRESSURE: 102 MMHG | BODY MASS INDEX: 38.22 KG/M2

## 2022-01-17 DIAGNOSIS — R07.1 CHEST PAIN ON RESPIRATION: ICD-10-CM

## 2022-01-17 DIAGNOSIS — K76.0 FATTY LIVER: ICD-10-CM

## 2022-01-17 DIAGNOSIS — K76.89 LIVER NODULE: ICD-10-CM

## 2022-01-17 DIAGNOSIS — Z71.89 EDUCATED ABOUT COVID-19 VIRUS INFECTION: ICD-10-CM

## 2022-01-17 DIAGNOSIS — R07.1 CHEST PAIN ON RESPIRATION: Primary | ICD-10-CM

## 2022-01-17 PROCEDURE — 99214 OFFICE O/P EST MOD 30 MIN: CPT | Performed by: FAMILY MEDICINE

## 2022-01-17 PROCEDURE — 1036F TOBACCO NON-USER: CPT | Performed by: FAMILY MEDICINE

## 2022-01-17 PROCEDURE — 71046 X-RAY EXAM CHEST 2 VIEWS: CPT

## 2022-01-17 NOTE — PATIENT INSTRUCTIONS
Problem List Items Addressed This Visit     Chest pain on respiration - Primary     Patient does appear to have some chest pain with respiration  Unsure as to cause  There is some minor egophony noted, which could bring up the possibility pneumonia verses other  At this point, would recommend check chest x-ray, and follow-up afterwards  Relevant Orders    XR chest pa & lateral    Fatty liver     Noted before on ultrasound  Seems stable  No changes  Liver nodule     Ultrasound noted in November to have unchanged nodularity  Was felt to be benign at that  Other Visit Diagnoses     Educated about COVID-19 virus infection        Patient did receive 1st vaccine dose of COVID vaccine  Due for 2nd on the 24th  COVID 19 Instructions    Vashti Torres was advised to limit contact with others to essential tasks such as getting food, medications, and medical care  Proper handwashing reviewed, and Hand sanitzer when washing is not available  If the patient develops symptoms of COVID 19, the patient should call the office as soon as possible  For 0356-1865 Flu season, it is strongly recommended that Flu Vaccinations be obtained  Virtual Visits:  Kilo: This works on smart phones (any phone with Internet browsing capability)  You should get a text message when the provider is ready to see you  Click on the link in the text message, and the call should start  You will need to type in your name, and allow camera and microphone access  This is HIPPA compliant, and secure  If you have not already done so, get immunized to COVID 19  We are committed to getting you vaccinated as soon as possible and will be closely following CDC and [facility]  guidelines as they are released and revised  Please refer to our COVID-19 vaccine webpage for the most up to date information on the vaccine and our distribution efforts      This site will also have the most up to date recommendations for COVID booster vaccine  Raeann burris    Call [phone] ([phone]), option 7    OUR NEW LOCATION:    [facility]  [address], [facility] [address], [place], [address]  Fax: [phone]    Lab services and OB/GYN are at this location as well

## 2022-01-17 NOTE — ASSESSMENT & PLAN NOTE
Patient does appear to have some chest pain with respiration  Unsure as to cause  There is some minor egophony noted, which could bring up the possibility pneumonia verses other  At this point, would recommend check chest x-ray, and follow-up afterwards

## 2022-01-17 NOTE — PROGRESS NOTES
Assessment and Plan:    Problem List Items Addressed This Visit     Chest pain on respiration - Primary     Patient does appear to have some chest pain with respiration  Unsure as to cause  There is some minor egophony noted, which could bring up the possibility pneumonia verses other  At this point, would recommend check chest x-ray, and follow-up afterwards  Relevant Orders    XR chest pa & lateral    Fatty liver     Noted before on ultrasound  Seems stable  No changes  Liver nodule     Ultrasound noted in November to have unchanged nodularity  Was felt to be benign at that  Other Visit Diagnoses     Educated about COVID-19 virus infection        Patient did receive 1st vaccine dose of COVID vaccine  Due for 2nd on the 24th  Diagnoses and all orders for this visit:    Chest pain on respiration  -     XR chest pa & lateral; Future    Fatty liver    Liver nodule    Educated about COVID-19 virus infection  Comments:  Patient did receive 1st vaccine dose of COVID vaccine  Due for 2nd on the 24th  Subjective:      Patient ID: Henny Hernandez is a 22 y o  male  CC:    Chief Complaint   Patient presents with    Follow-up     3 month f/u     Shortness of Breath     when taking deep breath pt states his chest feels tight  Pt states this started late nov/dec  HPI:    Please see chief complaint  Toght chest with deep breath  Not SOB  Is only with deep breath  No MERCER  No changes with cold weather  Has been since late nov  Nausea: still has at times  Comes and goes  The following portions of the patient's history were reviewed and updated as appropriate: allergies, current medications, past family history, past medical history, past social history, past surgical history and problem list       Review of Systems   Constitutional: Negative  HENT: Negative  Eyes: Negative      Respiratory: Positive for chest tightness (per HPI)  Cardiovascular: Negative  Gastrointestinal: Negative  Endocrine: Negative  Genitourinary: Negative  Musculoskeletal: Negative  Skin: Negative  Allergic/Immunologic: Negative  Neurological: Negative  Hematological: Negative  Psychiatric/Behavioral: Negative  Data to review:   US from Nov 2021:  Hepatomegaly  Hepatic mild steatosis  Stable left lobe nodular parenchyma       Objective:    Vitals:    01/17/22 1451   BP: 102/70   BP Location: Left arm   Patient Position: Sitting   Cuff Size: Adult   Pulse: (!) 106   Temp: 97 5 °F (36 4 °C)   TempSrc: Temporal   Weight: 124 kg (273 lb)   Height: 5' 11" (1 803 m)        Physical Exam  Vitals and nursing note reviewed  Constitutional:       Appearance: Normal appearance  Neck:      Vascular: No carotid bruit  Cardiovascular:      Rate and Rhythm: Normal rate and regular rhythm  Pulses: Normal pulses  Carotid pulses are 2+ on the right side and 2+ on the left side  Heart sounds: Normal heart sounds  No murmur heard  No gallop  Pulmonary:      Effort: Pulmonary effort is normal  No respiratory distress  Breath sounds: Normal breath sounds  No stridor  No wheezing, rhonchi or rales  Chest:      Chest wall: No tenderness  Neurological:      Mental Status: He is alert  BMI Counseling: Body mass index is 38 08 kg/m²  The BMI is above normal  Nutrition recommendations include decreasing portion sizes, encouraging healthy choices of fruits and vegetables, decreasing fast food intake, consuming healthier snacks, limiting drinks that contain sugar, moderation in carbohydrate intake, increasing intake of lean protein, reducing intake of saturated and trans fat and reducing intake of cholesterol  Exercise recommendations include exercising 3-5 times per week  No pharmacotherapy was ordered  Rationale for BMI follow-up plan is due to patient being overweight or obese

## 2022-01-31 ENCOUNTER — OFFICE VISIT (OUTPATIENT)
Dept: FAMILY MEDICINE CLINIC | Facility: CLINIC | Age: 22
End: 2022-01-31
Payer: COMMERCIAL

## 2022-01-31 VITALS
HEIGHT: 71 IN | TEMPERATURE: 97.7 F | WEIGHT: 278.5 LBS | DIASTOLIC BLOOD PRESSURE: 60 MMHG | BODY MASS INDEX: 38.99 KG/M2 | SYSTOLIC BLOOD PRESSURE: 114 MMHG

## 2022-01-31 DIAGNOSIS — R07.1 CHEST PAIN ON RESPIRATION: Primary | ICD-10-CM

## 2022-01-31 PROCEDURE — 99213 OFFICE O/P EST LOW 20 MIN: CPT | Performed by: FAMILY MEDICINE

## 2022-01-31 PROCEDURE — 3008F BODY MASS INDEX DOCD: CPT | Performed by: FAMILY MEDICINE

## 2022-01-31 NOTE — PATIENT INSTRUCTIONS
Problem List Items Addressed This Visit     Chest pain on respiration - Primary     Chest x-ray was normal   At this point, the patient continues to have some problems with deep inspiration  Would recommend PFTs  If they are normal, consider pulmonary verses CT scan, though I would lean towards pulmonary  Differential included in PFTs would be asthma, COPD, other restrictive lung diseases  Relevant Orders    Complete PFT without post bronchodilator          COVID 19 Instructions    Bárbara Shah was advised to limit contact with others to essential tasks such as getting food, medications, and medical care  Proper handwashing reviewed, and Hand sanitzer when washing is not available  If the patient develops symptoms of COVID 19, the patient should call the office as soon as possible  For 4210-1130 Flu season, it is strongly recommended that Flu Vaccinations be obtained  Virtual Visits:  Kilo: This works on smart phones (any phone with Internet browsing capability)  You should get a text message when the provider is ready to see you  Click on the link in the text message, and the call should start  You will need to type in your name, and allow camera and microphone access  This is HIPPA compliant, and secure  If you have not already done so, get immunized to COVID 19  We are committed to getting you vaccinated as soon as possible and will be closely following CDC and [facility]  guidelines as they are released and revised  Please refer to our COVID-19 vaccine webpage for the most up to date information on the vaccine and our distribution efforts  This site will also have the most up to date recommendations for COVID booster vaccine      [employer]    Call [phone]), option [phone]    OUR NEW LOCATION:    [address], [facility] [address], [place], [address] [facility]  Fax: [phone]    Lab services and OB/GYN are at this location as well

## 2022-01-31 NOTE — ASSESSMENT & PLAN NOTE
Chest x-ray was normal   At this point, the patient continues to have some problems with deep inspiration  Would recommend PFTs  If they are normal, consider pulmonary verses CT scan, though I would lean towards pulmonary  Differential included in PFTs would be asthma, COPD, other restrictive lung diseases

## 2022-03-07 ENCOUNTER — HOSPITAL ENCOUNTER (OUTPATIENT)
Dept: PULMONOLOGY | Facility: HOSPITAL | Age: 22
Discharge: HOME | End: 2022-03-07
Payer: COMMERCIAL

## 2022-03-07 DIAGNOSIS — R07.1 CHEST PAIN ON RESPIRATION: ICD-10-CM

## 2022-03-07 PROCEDURE — 94729 DIFFUSING CAPACITY: CPT | Performed by: INTERNAL MEDICINE

## 2022-03-07 PROCEDURE — 94760 N-INVAS EAR/PLS OXIMETRY 1: CPT

## 2022-03-07 PROCEDURE — 94726 PLETHYSMOGRAPHY LUNG VOLUMES: CPT | Performed by: INTERNAL MEDICINE

## 2022-03-07 PROCEDURE — 94729 DIFFUSING CAPACITY: CPT

## 2022-03-07 PROCEDURE — 94010 BREATHING CAPACITY TEST: CPT

## 2022-03-07 PROCEDURE — 94726 PLETHYSMOGRAPHY LUNG VOLUMES: CPT

## 2022-03-07 PROCEDURE — 94010 BREATHING CAPACITY TEST: CPT | Performed by: INTERNAL MEDICINE

## 2022-03-16 ENCOUNTER — TELEPHONE (OUTPATIENT)
Dept: PSYCHIATRY | Facility: CLINIC | Age: 22
End: 2022-03-16

## 2022-07-25 ENCOUNTER — TELEPHONE (OUTPATIENT)
Dept: FAMILY MEDICINE CLINIC | Facility: CLINIC | Age: 22
End: 2022-07-25

## 2022-07-25 ENCOUNTER — TELEPHONE (OUTPATIENT)
Dept: PSYCHIATRY | Facility: CLINIC | Age: 22
End: 2022-07-25

## 2022-07-25 NOTE — TELEPHONE ENCOUNTER
Patient notified about Dr Arely Mackay not in office and most likely he will need an evaluation from one of our other providers  Patient will call us back to schedule ov

## 2022-07-25 NOTE — TELEPHONE ENCOUNTER
PATIENT CALLED IN NEEDS A REFERRAL FOR PSYCHIATRY PLACED IN CHART PLEASE CALL PATIENT REGARDING THIS MATTER

## 2022-07-25 NOTE — TELEPHONE ENCOUNTER
Pt was checking to see if pt was still on the wait list for both   Non referral wait list  It is confirmed     Pt gave verbal consent to speak to mother Vidhya Gil on his behalf for appointment and finding out about the wait list

## 2022-08-02 ENCOUNTER — TELEPHONE (OUTPATIENT)
Dept: PSYCHIATRY | Facility: CLINIC | Age: 22
End: 2022-08-02

## 2022-08-29 ENCOUNTER — OFFICE VISIT (OUTPATIENT)
Dept: FAMILY MEDICINE CLINIC | Facility: CLINIC | Age: 22
End: 2022-08-29
Payer: COMMERCIAL

## 2022-08-29 VITALS
HEART RATE: 80 BPM | WEIGHT: 270 LBS | DIASTOLIC BLOOD PRESSURE: 60 MMHG | BODY MASS INDEX: 37.8 KG/M2 | SYSTOLIC BLOOD PRESSURE: 112 MMHG | HEIGHT: 71 IN

## 2022-08-29 DIAGNOSIS — F41.9 ANXIETY AND DEPRESSION: Primary | ICD-10-CM

## 2022-08-29 DIAGNOSIS — F32.A ANXIETY AND DEPRESSION: Primary | ICD-10-CM

## 2022-08-29 PROCEDURE — 99214 OFFICE O/P EST MOD 30 MIN: CPT | Performed by: PHYSICIAN ASSISTANT

## 2022-08-29 PROCEDURE — 3725F SCREEN DEPRESSION PERFORMED: CPT | Performed by: PHYSICIAN ASSISTANT

## 2022-08-29 RX ORDER — SERTRALINE HYDROCHLORIDE 25 MG/1
25 TABLET, FILM COATED ORAL DAILY
Qty: 30 TABLET | Refills: 1 | Status: SHIPPED | OUTPATIENT
Start: 2022-08-29 | End: 2022-10-10 | Stop reason: SDUPTHER

## 2022-08-29 NOTE — ASSESSMENT & PLAN NOTE
PT will be set up with Jaxon Valle and started on zoloft 25 mg  while waiting to see [facility] psych  NO SI or HI  RTO in 6 weeks

## 2022-08-29 NOTE — PATIENT INSTRUCTIONS
Problem List Items Addressed This Visit          Other    Anxiety and depression - Primary     PT will be set up with Chuy Faith and started on zoloft 25 mg  while waiting to see [facility]  NO SI or HI  RTO in 6 weeks              Relevant Medications    sertraline (Zoloft) 25 mg tablet    Other Relevant Orders    Ambulatory Referral to Psychiatry    Ambulatory Referral to Psychology

## 2022-08-29 NOTE — PROGRESS NOTES
Assessment and Plan:    Problem List Items Addressed This Visit        Other    Anxiety and depression - Primary     PT will be set up with Ann Rollins and started on zoloft 25 mg  while waiting to see [facility]  NO SI or HI  RTO in 6 weeks  Relevant Medications    sertraline (Zoloft) 25 mg tablet    Other Relevant Orders    Ambulatory Referral to Psychiatry    Ambulatory Referral to Psychology                 Diagnoses and all orders for this visit:    Anxiety and depression  -     Ambulatory Referral to Psychiatry; Future  -     Ambulatory Referral to Psychology; Future  -     sertraline (Zoloft) 25 mg tablet; Take 1 tablet (25 mg total) by mouth daily            Subjective:      Patient ID: Samuel Simmonds is a 25 y o  male  CC:    Chief Complaint   Patient presents with    Anxiety     C/o needs a referral to  a Psychiatrist  States he feels sad all the time and has anxiety in public  mjs       HPI:    Patient here today with complaints of anxiety and depression since he was a young teenager roughly [age]  He did see Dr Nellie Gardiner for this roughly 2020 and blood work was done and then patient did not seek any more treatment he has talked to a couple therapist in the past few years however at this point time he use really seriously trying to seek help and treatment  He would like referral for Psychology and Psychiatry  He has never been offered medication for many medical provider in the past he is not suicidal no ideations no plan  He lives with his mom stepfather and stepbrother  Mom does have issues with anxiety  He works at [employer] and has been there for 3 years and still gets anxiety sometimes when not being able to answer customers questions or even sometimes just walking into work  He states that he has no triggers sometimes will just wake up feeling down and blue and sad         The following portions of the patient's history were reviewed and updated as appropriate: allergies, current medications, past family history, past medical history, past social history, past surgical history and problem list       Review of Systems   Constitutional: Negative  HENT: Negative  Eyes: Negative  Respiratory: Negative  Cardiovascular: Negative  Gastrointestinal: Negative  Endocrine: Negative  Genitourinary: Negative  Musculoskeletal: Negative  Skin: Negative  Allergic/Immunologic: Negative  Neurological: Negative  Hematological: Negative  Psychiatric/Behavioral: Positive for dysphoric mood  The patient is nervous/anxious  Data to review:       Objective:    Vitals:    08/29/22 0849   BP: 112/60   Pulse: 80   Weight: 122 kg (270 lb)   Height: 5' 11" (1 803 m)        Physical Exam  Vitals and nursing note reviewed  Constitutional:       Appearance: Normal appearance  HENT:      Head: Normocephalic and atraumatic  Eyes:      General: Lids are normal       Conjunctiva/sclera: Conjunctivae normal       Pupils: Pupils are equal, round, and reactive to light  Cardiovascular:      Rate and Rhythm: Normal rate and regular rhythm  Heart sounds: Normal heart sounds  Pulmonary:      Effort: Pulmonary effort is normal       Breath sounds: Normal breath sounds  Skin:     General: Skin is warm and dry  Neurological:      General: No focal deficit present  Mental Status: He is alert  Mental status is at baseline  Psychiatric:         Mood and Affect: Mood normal          Behavior: Behavior normal          Thought Content:  Thought content normal          Judgment: Judgment normal

## 2022-09-29 ENCOUNTER — TELEPHONE (OUTPATIENT)
Dept: PSYCHIATRY | Facility: CLINIC | Age: 22
End: 2022-09-29

## 2022-10-05 NOTE — TELEPHONE ENCOUNTER
Connected with patient via telephone and verified that the patient is interested in med mgmt   services and would like to be added to the waitlist

## 2022-10-10 ENCOUNTER — OFFICE VISIT (OUTPATIENT)
Dept: FAMILY MEDICINE CLINIC | Facility: CLINIC | Age: 22
End: 2022-10-10
Payer: COMMERCIAL

## 2022-10-10 VITALS
DIASTOLIC BLOOD PRESSURE: 66 MMHG | BODY MASS INDEX: 37.1 KG/M2 | HEART RATE: 76 BPM | WEIGHT: 265 LBS | HEIGHT: 71 IN | SYSTOLIC BLOOD PRESSURE: 118 MMHG | TEMPERATURE: 97.7 F

## 2022-10-10 DIAGNOSIS — F32.A ANXIETY AND DEPRESSION: Primary | ICD-10-CM

## 2022-10-10 DIAGNOSIS — F41.9 ANXIETY AND DEPRESSION: Primary | ICD-10-CM

## 2022-10-10 PROCEDURE — 99213 OFFICE O/P EST LOW 20 MIN: CPT | Performed by: PHYSICIAN ASSISTANT

## 2022-10-10 NOTE — PATIENT INSTRUCTIONS
1  Anxiety and depression  Assessment & Plan:  Zoloft 25 mg without benefit for the past month  Increased to 50 mg   Keep appointment with therapist air in our office on the 25th of this month  [facility] put him on a wait list   Check 1 month  Orders:  -     sertraline (ZOLOFT) 50 mg tablet;  Take 1 tablet (50 mg total) by mouth daily

## 2022-10-10 NOTE — ASSESSMENT & PLAN NOTE
Zoloft 25 mg without benefit for the past month  Increased to 50 mg   Keep appointment with therapist air in our office on the 25th of this month  [facility] put him on a wait list   Check 1 month

## 2022-10-10 NOTE — PROGRESS NOTES
Name: Samuel Simmonds      : 2000      MRN: [id number]  Encounter Provider: Celeste Epley, PA-C  Encounter Date: 10/10/2022   Encounter department: [place] [address]     1  Anxiety and depression  Assessment & Plan:  Zoloft 25 mg without benefit for the past month  Increased to 50 mg   Keep appointment with therapist air in our office on the  of this month  [facility] put him on a wait list   Check 1 month  Orders:  -     sertraline (ZOLOFT) 50 mg tablet; Take 1 tablet (50 mg total) by mouth daily           Subjective      PT hereto day to follow up on initiation of zoloft  Patient states that he feels absolutely no difference on taking the medication he is tolerating it well without any side effects  No SI or HI  His 1st appointment with Richie Danielle for therapy is not until the end of this month on the   [facility] also called him and told him he was on a wait list   Continues to have anxiety and depression but no more than usual per patient  Review of Systems   Constitutional: Negative  HENT: Negative  Eyes: Negative  Respiratory: Negative  Cardiovascular: Negative  Gastrointestinal: Negative  Endocrine: Negative  Genitourinary: Negative  Musculoskeletal: Negative  Skin: Negative  Allergic/Immunologic: Negative  Neurological: Negative  Hematological: Negative  Psychiatric/Behavioral: Negative          Current Outpatient Medications on File Prior to Visit   Medication Sig   • famotidine (PEPCID) 20 mg tablet Take 1 tablet (20 mg total) by mouth daily (Patient taking differently: Take 20 mg by mouth daily as needed)   • pantoprazole (PROTONIX) 40 mg tablet Take 1 tablet (40 mg total) by mouth daily before breakfast (Patient taking differently: Take 40 mg by mouth daily as needed)   • [DISCONTINUED] sertraline (Zoloft) 25 mg tablet Take 1 tablet (25 mg total) by mouth daily       Objective     /66 (BP Location: Left arm, Patient Position: Sitting, Cuff Size: Adult)   Pulse 76   Temp 97 7 °F (36 5 °C) (Probe)   Ht 5' 11" (1 803 m) Comment: on file  Wt 120 kg (265 lb)   BMI 36 96 kg/m²     Physical Exam  Vitals and nursing note reviewed  Constitutional:       General: He is not in acute distress  Appearance: He is well-developed  He is not diaphoretic  HENT:      Head: Normocephalic and atraumatic  Eyes:      General:         Right eye: No discharge  Left eye: No discharge  Conjunctiva/sclera: Conjunctivae normal    Neck:      Vascular: No carotid bruit  Cardiovascular:      Rate and Rhythm: Normal rate and regular rhythm  Heart sounds: Normal heart sounds  No murmur heard  No friction rub  No gallop  Pulmonary:      Effort: Pulmonary effort is normal  No respiratory distress  Breath sounds: Normal breath sounds  No wheezing or rales  Skin:     General: Skin is warm and dry  Neurological:      Mental Status: He is alert and oriented to person, place, and time     Psychiatric:         Judgment: Judgment normal        Anthony Hillman PA-C

## 2022-11-09 ENCOUNTER — TELEMEDICINE (OUTPATIENT)
Dept: FAMILY MEDICINE CLINIC | Facility: CLINIC | Age: 22
End: 2022-11-09

## 2022-11-09 VITALS — WEIGHT: 265 LBS | TEMPERATURE: 99.4 F | HEIGHT: 71 IN | BODY MASS INDEX: 37.1 KG/M2

## 2022-11-09 DIAGNOSIS — F32.A ANXIETY AND DEPRESSION: Primary | ICD-10-CM

## 2022-11-09 DIAGNOSIS — F41.9 ANXIETY AND DEPRESSION: Primary | ICD-10-CM

## 2022-11-09 RX ORDER — SERTRALINE HYDROCHLORIDE 100 MG/1
100 TABLET, FILM COATED ORAL DAILY
Qty: 90 TABLET | Refills: 0 | Status: SHIPPED | OUTPATIENT
Start: 2022-11-09

## 2022-11-09 NOTE — PROGRESS NOTES
Virtual Regular Visit    Verification of patient location:    Patient is located in the following state in which I hold an active license [place]      Assessment/Plan:    Problem List Items Addressed This Visit        Other    Anxiety and depression - Primary     Patient missed his 1st appointment with her therapist in the office with Rufina Gallardo so will give his information to the girls upfront at check out to then relay to Rufina Gallardo that he needs a new appointment  Patient slowly making progress on how he is feeling with his anxiety and depression at this point time will bring his Zoloft to a total of 100  For the next 2 weeks I want him to take 1/2 of the 50s to equal 75 mg and then moved to the 100s thereafter  I would like to check in 6 weeks  He can definitely call or touch base with me sooner if needed  Relevant Medications    sertraline (ZOLOFT) 100 mg tablet               Reason for visit is   Chief Complaint   Patient presents with   • Anxiety     F/u on medication increase  Pt states he notices a slight improvement but nothing drastic  • Depression   • Virtual Regular Visit        Encounter provider Jennifer Browne PA-C    Provider located at [address] [address] [phone]      Recent Visits  No visits were found meeting these conditions  Showing recent visits within past 7 days and meeting all other requirements  Today's Visits  Date Type Provider Dept   11/09/22 1135 Nyasia Baldwin PA-C [facility]   Showing today's visits and meeting all other requirements  Future Appointments  No visits were found meeting these conditions  Showing future appointments within next 150 days and meeting all other requirements       The patient was identified by name and date of birth   Jennifer Zapata was informed that this is a telemedicine visit and that the visit is being conducted through the [facility] platform  He agrees to proceed     My office door was closed  No one else was in the room  He acknowledged consent and understanding of privacy and security of the video platform  The patient has agreed to participate and understands they can discontinue the visit at any time  Patient is aware this is a billable service  Subjective  Yaritza Castelan is a 25 y o  male pt   Virtual apt to review med adjustment from lat visit  At the last visit we increased his Zoloft from 25-50 mg  Unfortunately patient accidentally missed his 1st appointment with a therapist here in our office  He states this was not intentional   He states that the medication increase it is not giving him any side effects but he is only feeling slightly improved with less anxiety and less depression  He states that his mom is the 1 who manages his pillbox for his medications and he does have her support no SI or HI  He would like to see Sharman Gottron and will reschedule and is thinking that a medication adjustment may be of benefit at this time  History reviewed  No pertinent past medical history  Past Surgical History:   Procedure Laterality Date   • WISDOM TOOTH EXTRACTION  2019       Current Outpatient Medications   Medication Sig Dispense Refill   • sertraline (ZOLOFT) 100 mg tablet Take 1 tablet (100 mg total) by mouth daily 90 tablet 0   • famotidine (PEPCID) 20 mg tablet Take 1 tablet (20 mg total) by mouth daily (Patient not taking: Reported on 11/9/2022) 30 tablet 5   • pantoprazole (PROTONIX) 40 mg tablet Take 1 tablet (40 mg total) by mouth daily before breakfast (Patient not taking: Reported on 11/9/2022) 30 tablet 5     No current facility-administered medications for this visit  No Known Allergies    Review of Systems   Constitutional: Negative  HENT: Negative  Eyes: Negative  Respiratory: Negative  Cardiovascular: Negative  Gastrointestinal: Negative  Endocrine: Negative  Genitourinary: Negative  Musculoskeletal: Negative  Skin: Negative  Allergic/Immunologic: Negative  Neurological: Negative  Hematological: Negative  Psychiatric/Behavioral: Positive for dysphoric mood  Negative for suicidal ideas  The patient is nervous/anxious  Video Exam    Vitals:    11/09/22 0846   Temp: 99 4 °F (37 4 °C)   TempSrc: Probe   Weight: 120 kg (265 lb)   Height: 5' 11" (1 803 m)       Physical Exam  Vitals and nursing note reviewed  Constitutional:       Appearance: Normal appearance  HENT:      Head: Normocephalic and atraumatic  Eyes:      General:         Right eye: No discharge  Left eye: No discharge  Conjunctiva/sclera: Conjunctivae normal       Pupils: Pupils are equal, round, and reactive to light  Pulmonary:      Effort: Pulmonary effort is normal    Skin:     General: Skin is warm and dry  Neurological:      General: No focal deficit present  Mental Status: He is alert  Psychiatric:         Attention and Perception: Attention normal          Mood and Affect: Mood normal          Speech: Speech normal          Behavior: Behavior normal  Behavior is cooperative  Thought Content:  Thought content normal          Cognition and Memory: Cognition normal          Judgment: Judgment normal           I spent 15 minutes directly with the patient during this visit

## 2022-11-09 NOTE — PATIENT INSTRUCTIONS
1  Anxiety and depression  Assessment & Plan:  Patient missed his 1st appointment with her therapist in the office with Wagner Egan so will give his information to the girls upfront at check out to then relay to Wagner Egan that he needs a new appointment  Patient slowly making progress on how he is feeling with his anxiety and depression at this point time will bring his Zoloft to a total of 100  For the next 2 weeks I want him to take 1/2 of the 50s to equal 75 mg and then moved to the 100s thereafter  I would like to check in 6 weeks  He can definitely call or touch base with me sooner if needed

## 2022-11-09 NOTE — ASSESSMENT & PLAN NOTE
Patient missed his 1st appointment with her therapist in the office with Adolfo Dorman so will give his information to the girls upfront at check out to then relay to Adolfo Dorman that he needs a new appointment  Patient slowly making progress on how he is feeling with his anxiety and depression at this point time will bring his Zoloft to a total of 100  For the next 2 weeks I want him to take 1/2 of the 50s to equal 75 mg and then moved to the 100s thereafter  I would like to check in 6 weeks  He can definitely call or touch base with me sooner if needed

## 2023-03-02 DIAGNOSIS — F41.9 ANXIETY AND DEPRESSION: ICD-10-CM

## 2023-03-02 DIAGNOSIS — F32.A ANXIETY AND DEPRESSION: ICD-10-CM

## 2023-03-02 RX ORDER — SERTRALINE HYDROCHLORIDE 100 MG/1
TABLET, FILM COATED ORAL
Qty: 90 TABLET | Refills: 0 | Status: SHIPPED | OUTPATIENT
Start: 2023-03-02

## 2023-03-20 ENCOUNTER — TELEPHONE (OUTPATIENT)
Dept: PSYCHIATRY | Facility: CLINIC | Age: 23
End: 2023-03-20

## 2023-06-25 DIAGNOSIS — F32.A ANXIETY AND DEPRESSION: ICD-10-CM

## 2023-06-25 DIAGNOSIS — F41.9 ANXIETY AND DEPRESSION: ICD-10-CM

## 2023-06-26 RX ORDER — SERTRALINE HYDROCHLORIDE 100 MG/1
TABLET, FILM COATED ORAL
Qty: 90 TABLET | Refills: 0 | Status: SHIPPED | OUTPATIENT
Start: 2023-06-26

## 2023-07-26 ENCOUNTER — OFFICE VISIT (OUTPATIENT)
Dept: FAMILY MEDICINE CLINIC | Facility: CLINIC | Age: 23
End: 2023-07-26
Payer: COMMERCIAL

## 2023-07-26 VITALS
BODY MASS INDEX: 39.06 KG/M2 | DIASTOLIC BLOOD PRESSURE: 68 MMHG | HEIGHT: 71 IN | SYSTOLIC BLOOD PRESSURE: 100 MMHG | TEMPERATURE: 98.3 F | WEIGHT: 279 LBS

## 2023-07-26 DIAGNOSIS — F32.A ANXIETY AND DEPRESSION: Primary | ICD-10-CM

## 2023-07-26 DIAGNOSIS — F41.9 ANXIETY AND DEPRESSION: Primary | ICD-10-CM

## 2023-07-26 PROCEDURE — 99214 OFFICE O/P EST MOD 30 MIN: CPT | Performed by: PHYSICIAN ASSISTANT

## 2023-07-26 RX ORDER — SERTRALINE HYDROCHLORIDE 100 MG/1
150 TABLET, FILM COATED ORAL DAILY
Qty: 135 TABLET | Refills: 0 | Status: SHIPPED | OUTPATIENT
Start: 2023-07-26 | End: 2023-10-24

## 2023-07-26 NOTE — ASSESSMENT & PLAN NOTE
Uncontrolled w/o SI or HI. Contracts for safety. PT does not want to attend therapy as he can not pin point reasons for his moods being like they are. He did miss his first therapy session he did set up with Yael Aguilera. Will increase Zoloft to 150 mg and have pt work on activities to get him out of the house such as reading in the park. We continues to work PT at [employer]. Check back in 6 weeks.

## 2023-07-26 NOTE — PATIENT INSTRUCTIONS
Problem List Items Addressed This Visit          Other    Anxiety and depression - Primary     Uncontrolled w/o SI or HI. Contracts for safety. PT does not want to attend therapy as he can not pin point reasons for his moods being like they are. He did miss his first therapy session he did set up with Belinda Quispe. Will increase Zoloft to 150 mg and have pt work on activities to get him out of the house such as reading in the park. We continues to work PT at [employer]. Check back in 6 weeks.           Relevant Medications    sertraline (ZOLOFT) 100 mg tablet

## 2023-07-26 NOTE — PROGRESS NOTES
Name: Maeve Mariano      : 2000      MRN: [id number]  Encounter Provider: Roberto Farr PA-C  Encounter Date: 2023   [facility] [address]     1. Anxiety and depression  Assessment & Plan:  Uncontrolled w/o SI or HI. Contracts for safety. PT does not want to attend therapy as he can not pin point reasons for his moods being like they are. He did miss his first therapy session he did set up with Kofi Mace. Will increase Zoloft to 150 mg and have pt work on activities to get him out of the house such as reading in the park. We continues to work PT at [facility]. Check back in 6 weeks. Orders:  -     sertraline (ZOLOFT) 100 mg tablet; Take 1.5 tablets (150 mg total) by mouth daily      BMI Counseling: Body mass index is 38.91 kg/m². The BMI is above normal. Nutrition recommendations include decreasing portion sizes, encouraging healthy choices of fruits and vegetables, decreasing fast food intake, consuming healthier snacks, limiting drinks that contain sugar, moderation in carbohydrate intake, increasing intake of lean protein, reducing intake of saturated and trans fat and reducing intake of cholesterol. Exercise recommendations include exercising 3-5 times per week. No pharmacotherapy was ordered. Rationale for BMI follow-up plan is due to patient being overweight or obese. Subjective      Patient here today to follow-up on his Zoloft dosing for anxiety and depression. At the last visit we did increase his Zoloft from . Patient states every time we increase that he does feel improved for a period of a few months and then seems to slump back down and to feeling blue. He states that he cannot pinpoint any specific triggers and therefore does not think that therapy can help him because he does not know what he would talk about.   Patient states that he just does not feel like getting out of bed in the morning and will wake up and spent hours just laying in bed. He does live with his parents and does contract for safety. Apparently other members of the household are also on Zoloft. Patient does work part-time at [employer]. He does like playing table games or reading fantasy playing dungeons and dragons and video games. He does not otherwise get out of the house much. No SI or HI. We did have him set up for therapy with Zayra Villatoro however he missed his first appointment is not interested in rescheduling. Review of Systems   Constitutional: Negative. HENT: Negative. Eyes: Negative. Respiratory: Negative. Cardiovascular: Negative. Gastrointestinal: Negative. Endocrine: Negative. Genitourinary: Negative. Musculoskeletal: Negative. Skin: Negative. Allergic/Immunologic: Negative. Neurological: Negative. Hematological: Negative. Psychiatric/Behavioral: Negative. Current Outpatient Medications on File Prior to Visit   Medication Sig   • [DISCONTINUED] sertraline (ZOLOFT) 100 mg tablet TAKE 1 TABLET BY MOUTH EVERY DAY   • [DISCONTINUED] famotidine (PEPCID) 20 mg tablet Take 1 tablet (20 mg total) by mouth daily (Patient not taking: Reported on 11/9/2022)   • [DISCONTINUED] pantoprazole (PROTONIX) 40 mg tablet Take 1 tablet (40 mg total) by mouth daily before breakfast (Patient not taking: Reported on 11/9/2022)       Objective     /68 (BP Location: Left arm, Patient Position: Sitting, Cuff Size: Standard)   Temp 98.3 °F (36.8 °C) (Temporal)   Ht 5' 11" (1.803 m)   Wt 127 kg (279 lb)   BMI 38.91 kg/m²   Depression Screening Follow-up Plan: Patient's depression screening was positive with a PHQ-2 score of . Their PHQ-9 score was 15. Patient assessed for underlying major depression. They have no active suicidal ideations. Brief counseling provided and recommend additional follow-up/re-evaluation next office visit. Physical Exam  Vitals and nursing note reviewed.    Constitutional:       General: He is not in acute distress. Appearance: He is well-developed. He is not diaphoretic. HENT:      Head: Normocephalic and atraumatic. Eyes:      General:         Right eye: No discharge. Left eye: No discharge. Conjunctiva/sclera: Conjunctivae normal.   Neck:      Vascular: No carotid bruit. Cardiovascular:      Rate and Rhythm: Normal rate and regular rhythm. Heart sounds: Normal heart sounds. No murmur heard. No friction rub. No gallop. Pulmonary:      Effort: Pulmonary effort is normal. No respiratory distress. Breath sounds: Normal breath sounds. No wheezing or rales. Skin:     General: Skin is warm and dry. Neurological:      Mental Status: He is alert and oriented to person, place, and time.    Psychiatric:         Judgment: Judgment normal.       Vijay Pope PA-C

## 2024-03-04 ENCOUNTER — OFFICE VISIT (OUTPATIENT)
Dept: FAMILY MEDICINE CLINIC | Facility: CLINIC | Age: 24
End: 2024-03-04
Payer: COMMERCIAL

## 2024-03-04 VITALS
SYSTOLIC BLOOD PRESSURE: 112 MMHG | HEART RATE: 112 BPM | WEIGHT: 284.2 LBS | HEIGHT: 71 IN | BODY MASS INDEX: 39.79 KG/M2 | TEMPERATURE: 99 F | DIASTOLIC BLOOD PRESSURE: 64 MMHG

## 2024-03-04 DIAGNOSIS — Z13.220 LIPID SCREENING: ICD-10-CM

## 2024-03-04 DIAGNOSIS — Z00.00 ANNUAL PHYSICAL EXAM: Primary | ICD-10-CM

## 2024-03-04 DIAGNOSIS — F41.9 ANXIETY AND DEPRESSION: ICD-10-CM

## 2024-03-04 DIAGNOSIS — K64.9 HEMORRHOIDS, UNSPECIFIED HEMORRHOID TYPE: ICD-10-CM

## 2024-03-04 DIAGNOSIS — F32.A ANXIETY AND DEPRESSION: ICD-10-CM

## 2024-03-04 PROCEDURE — 99395 PREV VISIT EST AGE 18-39: CPT | Performed by: FAMILY MEDICINE

## 2024-03-04 RX ORDER — SERTRALINE HYDROCHLORIDE 100 MG/1
150 TABLET, FILM COATED ORAL DAILY
Qty: 135 TABLET | Refills: 0 | Status: SHIPPED | OUTPATIENT
Start: 2024-03-04 | End: 2024-06-02

## 2024-03-04 NOTE — PROGRESS NOTES
ADULT ANNUAL PHYSICAL  [facility] [address] PRIMARY CARE    NAME: Edgardo Jj  AGE: 23 y.o. SEX: male  : 2000     DATE: 3/4/2024     Assessment and Plan:     1. Annual physical exam  Comments:  Essentially normal physical exam.  Follow-up in 1 year.  Recommend screening laboratory studies.    2. Anxiety and depression  Assessment & Plan:  Negative SI, positive contract.  Continue with sertraline.  Renewed.  Follow-up in 6 months.    Orders:  -     sertraline (ZOLOFT) 100 mg tablet; Take 1.5 tablets (150 mg total) by mouth daily  -     CBC and differential; Future  -     Comprehensive metabolic panel; Future; Expected date: 2024  -     TSH, 3rd generation; Future; Expected date: 2024    3. Hemorrhoids, unspecified hemorrhoid type  Assessment & Plan:  Minor swelling and irritation.  Recommend keeping the stools softer by increasing fiber in the diet, increasing water intake.  If it becomes worse or more painful, would consider follow-up with colorectal surgery.    Orders:  -     CBC and differential; Future    4. Lipid screening  -     Comprehensive metabolic panel; Future; Expected date: 2024  -     Lipid Panel with Direct LDL reflex; Future; Expected date: 2024            Immunizations and preventive care screenings were discussed with patient today. Appropriate education was printed on patient's after visit summary.        Counseling:  Alcohol/drug use: discussed moderation in alcohol intake, the recommendations for healthy alcohol use, and avoidance of illicit drug use.  Dental Health: discussed importance of regular tooth brushing, flossing, and dental visits.  Injury prevention: discussed safety/seat belts, safety helmets, smoke detectors, carbon dioxide detectors, and smoking near bedding or upholstery.  Sexual health: discussed sexually transmitted diseases, partner selection, use of condoms, avoidance of unintended pregnancy, and  contraceptive alternatives.  Exercise: the importance of regular exercise/physical activity was discussed. Recommend exercise 3-5 times per week for at least 30 minutes.          Return in about 6 months (around 2024).     Chief Complaint:     Chief Complaint   Patient presents with   • Annual Exam   • Medication Refill     Zoloft , pending    • Allergies     Would like referral for allergies to get screened or labs       History of Present Illness:   Patient is here for physical exam, as well as to follow-up on anxiety and depression.  Currently using Zoloft.  Patient feels the Zoloft is working extremely well.  No specific problems or issues.    Negative SI, positive contract.    He would like to talk to allergist.  Increased problems when he goes to work. 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  Does use Claritin.  No nasal sprays.            Adult Annual Physical   Patient here for a comprehensive physical exam. The patient reports problems - anxiety and depression .    Diet and Physical Activity  Diet/Nutrition: well balanced diet, heart healthy (low sodium) diet, limited junk food, consuming 3-5 servings of fruits/vegetables daily, and then more fruits and vegetables, limiting snacks. .   Exercise: no formal exercise.      Depression Screening  PHQ-2/9 Depression Screening    Little interest or pleasure in doing things: 0 - not at all  Feeling down, depressed, or hopeless: 0 - not at all  Trouble falling or staying asleep, or sleeping too much: 0 - not at all  Feeling tired or having little energy: 0 - not at all  Poor appetite or overeatin - not at all  Feeling bad about yourself - or that you are a failure or have let yourself or your family down: 0 - not at all  Trouble concentrating on things, such as reading the newspaper or watching television: 0 - not at all  Moving or speaking so slowly that other people could have noticed. Or the opposite - being so fidgety or restless that you  have been moving around a lot more than usual: 0 - not at all  Thoughts that you would be better off dead, or of hurting yourself in some way: 0 - not at all  PHQ-9 Score: 0  PHQ-9 Interpretation: No or Minimal depression       General Health  Sleep: sleeps well.   Hearing: normal - bilateral.  Vision: no vision problems.   Dental: no dental visits for >1 year.        Health  Symptoms include: none    Advanced Care Planning  Do you have an advanced directive? no  Do you have a durable medical power of ? no  ACP document given to patient? no     Review of Systems:     Review of Systems   Constitutional: Negative.    HENT: Negative.     Eyes: Negative.    Respiratory: Negative.     Cardiovascular: Negative.    Gastrointestinal: Negative.    Genitourinary:         Irritation on the anus.      Past Medical History:     History reviewed. No pertinent past medical history.   Past Surgical History:     Past Surgical History:   Procedure Laterality Date   • WISDOM TOOTH EXTRACTION  2019      Family History:     Family History   Problem Relation Age of Onset   • No Known Problems Mother    • No Known Problems Father    • No Known Problems Brother    • No Known Problems Brother       Social History:     Social History     Socioeconomic History   • Marital status: Single     Spouse name: None   • Number of children: None   • Years of education: None   • Highest education level: None   Occupational History   • None   Tobacco Use   • Smoking status: Never   • Smokeless tobacco: Never   Vaping Use   • Vaping status: Never Used   Substance and Sexual Activity   • Alcohol use: Never   • Drug use: Never     Comment: Rare marijuana   • Sexual activity: Not Currently   Other Topics Concern   • None   Social History Narrative    Patient works at [employer], Cheese department.     Social Determinants of Health     Financial Resource Strain: Not on file   Food Insecurity: Not on file   Transportation Needs: Not on file   Physical  "Activity: Not on file   Stress: Not on file   Social Connections: Not on file   Intimate Partner Violence: Not on file   Housing Stability: Not on file      Current Medications:     Current Outpatient Medications   Medication Sig Dispense Refill   • sertraline (ZOLOFT) 100 mg tablet Take 1.5 tablets (150 mg total) by mouth daily 135 tablet 0     No current facility-administered medications for this visit.      Allergies:     No Known Allergies   Physical Exam:     /64 (BP Location: Left arm, Patient Position: Sitting, Cuff Size: Standard)   Pulse (!) 112   Temp 99 °F (37.2 °C) (Tympanic)   Ht 5' 11\" (1.803 m)   Wt 129 kg (284 lb 3.2 oz)   BMI 39.64 kg/m²     Physical Exam  Vitals and nursing note reviewed. Exam conducted with a chaperone present.   Constitutional:       General: He is not in acute distress.     Appearance: Normal appearance. He is normal weight. He is not ill-appearing, toxic-appearing or diaphoretic.   HENT:      Head: Normocephalic.      Right Ear: Tympanic membrane, ear canal and external ear normal. There is no impacted cerumen.      Left Ear: Tympanic membrane, ear canal and external ear normal. There is no impacted cerumen.      Nose: Nose normal.   Eyes:      General: No scleral icterus.        Right eye: No discharge.         Left eye: No discharge.      Extraocular Movements: Extraocular movements intact.      Conjunctiva/sclera: Conjunctivae normal.      Pupils: Pupils are equal, round, and reactive to light.   Cardiovascular:      Rate and Rhythm: Normal rate and regular rhythm.      Pulses: Normal pulses.      Heart sounds: No murmur heard.     No friction rub. No gallop.   Pulmonary:      Effort: Pulmonary effort is normal. No respiratory distress.      Breath sounds: Normal breath sounds. No stridor. No wheezing, rhonchi or rales.   Abdominal:      General: Abdomen is flat. Bowel sounds are normal. There is no distension.      Palpations: There is no mass.      Tenderness: " There is no abdominal tenderness. There is no guarding or rebound.   Genitourinary:      Musculoskeletal:         General: Normal range of motion.      Cervical back: Normal range of motion.   Skin:     General: Skin is warm and dry.      Capillary Refill: Capillary refill takes less than 2 seconds.      Coloration: Skin is not jaundiced.      Findings: No bruising or erythema.   Neurological:      General: No focal deficit present.      Mental Status: He is alert and oriented to person, place, and time. Mental status is at baseline.      Cranial Nerves: No cranial nerve deficit.      Sensory: No sensory deficit.      Motor: No weakness.      Coordination: Coordination normal.      Gait: Gait normal.      Deep Tendon Reflexes: Reflexes normal.   Psychiatric:         Mood and Affect: Mood normal.         Behavior: Behavior normal.         Thought Content: Thought content normal.         Judgment: Judgment normal.          Rohith Zapata MD  [facility] PRIMARY CARE

## 2024-03-04 NOTE — PATIENT INSTRUCTIONS
1. Annual physical exam  Comments:  Essentially normal physical exam.  Follow-up in 1 year.  Recommend screening laboratory studies.    2. Anxiety and depression  Assessment & Plan:  Negative SI, positive contract.  Continue with sertraline.  Renewed.  Follow-up in 6 months.    Orders:  -     sertraline (ZOLOFT) 100 mg tablet; Take 1.5 tablets (150 mg total) by mouth daily  -     CBC and differential; Future  -     Comprehensive metabolic panel; Future; Expected date: 03/04/2024  -     TSH, 3rd generation; Future; Expected date: 03/04/2024    3. Hemorrhoids, unspecified hemorrhoid type  Assessment & Plan:  Minor swelling and irritation.  Recommend keeping the stools softer by increasing fiber in the diet, increasing water intake.  If it becomes worse or more painful, would consider follow-up with colorectal surgery.    Orders:  -     CBC and differential; Future    4. Lipid screening  -     Comprehensive metabolic panel; Future; Expected date: 03/04/2024  -     Lipid Panel with Direct LDL reflex; Future; Expected date: 03/04/2024        COVID 19 Instructions    Edgardo Jj was advised to limit contact with others to essential tasks such as getting food, medications, and medical care.    Proper handwashing reviewed, and Hand sanitzer when washing is not available.    If the patient develops symptoms of COVID 19, the patient should call the office as soon as possible.    It is strongly recommended that Flu Vaccinations be obtained.      Virtual Visits:  Kilo: This works on smart phones (any phone with Internet browsing capability).  You should get a text message when the provider is ready to see you.  Click on the link in the text message, and the call should start.  You will need to type in your name, and allow camera and microphone access.  This is HIPPA compliant, and secure.      If you have not already done so, get immunized to COVID 19.      We are committed to getting you vaccinated as soon as  possible and will be closely following CDC and [facility] guidelines as they are released and revised.  Please refer to our COVID-19 vaccine webpage for the most up to date information on the vaccine and our distribution efforts.    This site will also have the most up to date recommendations for COVID booster vaccine.    [email or web address]    Call [phone] ([phone]), option 7    You can also visit https://www.vaccines.gov/ to find vaccines in your area.    OUR LOCATION:    [facility]  [address] [place] [address], [address]  [place], [place], [address] [phone]  Fax: [phone]    Lab services, Rheumatology, and OB/GYN are at this location as well.      Wellness Visit for Adults   AMBULATORY CARE:   A wellness visit  is when you see your healthcare provider to get screened for health problems. Your healthcare provider will also give you advice on how to stay healthy. Write down your questions so you remember to ask them. Ask your healthcare provider how often you should have a wellness visit.  What happens at a wellness visit:  Your healthcare provider will ask about your health, and your family history of health problems. This includes high blood pressure, heart disease, and cancer. He or she will ask if you have symptoms that concern you, if you smoke, and about your mood. You may also be asked about your intake of medicines, supplements, food, and alcohol. Any of the following may be done:  Your weight  will be checked. Your height may also be checked so your body mass index (BMI) can be calculated. Your BMI shows if you are at a healthy weight.    Your blood pressure  and heart rate will be checked. Your temperature may also be checked.    Blood and urine tests  may be done. Blood tests may be done to check your cholesterol levels. Abnormal cholesterol levels increase your risk for heart disease and stroke. You may also need a blood or urine test  to check for diabetes if you are at increased risk. Urine tests may be done to look for signs of an infection or kidney disease.    A physical exam  includes checking your heartbeat and lungs with a stethoscope. Your healthcare provider may also check your skin to look for sun damage.    Screening tests  may be recommended. A screening test is done to check for diseases that may not cause symptoms. The screening tests you may need depend on your age, gender, family history, and lifestyle habits. For example, colorectal screening may be recommended if you are 50 years old or older.    Screening tests you need if you are a woman:   A Pap smear  is used to screen for cervical cancer. Pap smears are usually done every 3 to 5 years depending on your age. You may need them more often if you have had abnormal Pap smear test results in the past. Ask your healthcare provider how often you should have a Pap smear.    A mammogram  is an x-ray of your breasts to screen for breast cancer. Experts recommend mammograms every 2 years starting at age 50 years. You may need a mammogram at age 49 years or younger if you have an increased risk for breast cancer. Talk to your healthcare provider about when you should start having mammograms and how often you need them.    Vaccines you may need:   Get an influenza vaccine  every year. The influenza vaccine protects you from the flu. Several types of viruses cause the flu. The viruses change over time, so new vaccines are made each year.    Get a tetanus-diphtheria (Td) booster vaccine  every 10 years. This vaccine protects you against tetanus and diphtheria. Tetanus is a severe infection that may cause painful muscle spasms and lockjaw. Diphtheria is a severe bacterial infection that causes a thick covering in the back of your mouth and throat.    Get a human papillomavirus (HPV) vaccine  if you are female and aged 19 to 26 or male 19 to 21 and never received it. This vaccine protects  you from HPV infection. HPV is the most common infection spread by sexual contact. HPV may also cause vaginal, penile, and anal cancers.    Get a pneumococcal vaccine  if you are aged 65 years or older. The pneumococcal vaccine is an injection given to protect you from pneumococcal disease. Pneumococcal disease is an infection caused by pneumococcal bacteria. The infection may cause pneumonia, meningitis, or an ear infection.    Get a shingles vaccine  if you are 60 or older, even if you have had shingles before. The shingles vaccine is an injection to protect you from the varicella-zoster virus. This is the same virus that causes chickenpox. Shingles is a painful rash that develops in people who had chickenpox or have been exposed to the virus.    How to eat healthy:  My Plate is a model for planning healthy meals. It shows the types and amounts of foods that should go on your plate. Fruits and vegetables make up about half of your plate, and grains and protein make up the other half. A serving of dairy is included on the side of your plate. The amount of calories and serving sizes you need depends on your age, gender, weight, and height. Examples of healthy foods are listed below:  Eat a variety of vegetables  such as dark green, red, and orange vegetables. You can also include canned vegetables low in sodium (salt) and frozen vegetables without added butter or sauces.    Eat a variety of fresh fruits , canned fruit in 100% juice, frozen fruit, and dried fruit.    Include whole grains.  At least half of the grains you eat should be whole grains. Examples include whole-wheat bread, wheat pasta, brown rice, and whole-grain cereals such as oatmeal.    Eat a variety of protein foods such as seafood (fish and shellfish), lean meat, and poultry without skin (turkey and chicken). Examples of lean meats include pork leg, shoulder, or tenderloin, and beef round, sirloin, tenderloin, and extra lean ground beef. Other  protein foods include eggs and egg substitutes, beans, peas, soy products, nuts, and seeds.    Choose low-fat dairy products such as skim or 1% milk or low-fat yogurt, cheese, and cottage cheese.    Limit unhealthy fats  such as butter, hard margarine, and shortening.       Exercise:  Exercise at least 30 minutes per day on most days of the week. Some examples of exercise include walking, biking, dancing, and swimming. You can also fit in more physical activity by taking the stairs instead of the elevator or parking farther away from stores. Include muscle strengthening activities 2 days each week. Regular exercise provides many health benefits. It helps you manage your weight, and decreases your risk for type 2 diabetes, heart disease, stroke, and high blood pressure. Exercise can also help improve your mood. Ask your healthcare provider about the best exercise plan for you.       General health and safety guidelines:   Do not smoke.  Nicotine and other chemicals in cigarettes and cigars can cause lung damage. Ask your healthcare provider for information if you currently smoke and need help to quit. E-cigarettes or smokeless tobacco still contain nicotine. Talk to your healthcare provider before you use these products.    Limit alcohol.  A drink of alcohol is 12 ounces of beer, 5 ounces of wine, or 1½ ounces of liquor.    Lose weight, if needed.  Being overweight increases your risk of certain health conditions. These include heart disease, high blood pressure, type 2 diabetes, and certain types of cancer.    Protect your skin.  Do not sunbathe or use tanning beds. Use sunscreen with a SPF 15 or higher. Apply sunscreen at least 15 minutes before you go outside. Reapply sunscreen every 2 hours. Wear protective clothing, hats, and sunglasses when you are outside.    Drive safely.  Always wear your seatbelt. Make sure everyone in your car wears a seatbelt. A seatbelt can save your life if you are in an accident. Do  not use your cell phone when you are driving. This could distract you and cause an accident. Pull over if you need to make a call or send a text message.    Practice safe sex.  Use latex condoms if are sexually active and have more than one partner. Your healthcare provider may recommend screening tests for sexually transmitted infections (STIs).    Wear helmets, lifejackets, and protective gear.  Always wear a helmet when you ride a bike or motorcycle, go skiing, or play sports that could cause a head injury. Wear protective equipment when you play sports. Wear a lifejacket when you are on a boat or doing water sports.    © Copyright [employer] 2023 Information is for End User's use only and may not be sold, redistributed or otherwise used for commercial purposes.  The above information is an  only. It is not intended as medical advice for individual conditions or treatments. Talk to your doctor, nurse or pharmacist before following any medical regimen to see if it is safe and effective for you.    Obesity   AMBULATORY CARE:   Obesity  means your body mass index (BMI) is greater than 30. Your healthcare provider will use your age, height, and weight to measure your BMI.  The risks of obesity include  many health problems, including injuries or physical disability.  Diabetes (high blood sugar level)    High blood pressure or high cholesterol    Heart disease or heart failure    Stroke    Gallbladder or liver disease    Cancer of the colon, breast, prostate, liver, or kidney    Sleep apnea    Arthritis or gout    Screening  is done to check for health conditions before you have signs or symptoms. If you are 35 to 70 years old, your blood sugar level may be checked every 3 years for signs of prediabetes or diabetes. Your healthcare provider will check your blood pressure at each visit. High blood pressure can lead to a stroke or other problems. Your provider may check for signs of heart disease, cancer,  or other health problems.  Seek care immediately if:   You have a severe headache, confusion, or difficulty speaking.    You have weakness on one side of your body.    You have chest pain, sweating, or shortness of breath.    Call your doctor if:   You have symptoms of gallbladder or liver disease, such as pain in your upper abdomen.    You have knee or hip pain and discomfort while walking.    You have symptoms of diabetes, such as intense hunger and thirst, and frequent urination.    You have symptoms of sleep apnea, such as snoring or daytime sleepiness.    You have questions or concerns about your condition or care.    Treatment for obesity  focuses on helping you lose weight to improve your health. Even a small decrease in BMI can reduce the risk for many health problems. Your healthcare provider will help you set a weight-loss goal.  Lifestyle changes  are the first step in treating obesity. These include making healthy food choices and getting regular physical activity. Your healthcare provider may suggest a weight-loss program that involves coaching, education, and therapy.    Medicine  may help you lose weight when it is used with a healthy foods and physical activity.    Surgery  can help you lose weight if you have obesity along with other health problems. Several types of weight-loss surgery are available. Ask your healthcare provider for more information.    Tips for safe weight loss:   Set small, realistic goals.  An example of a small goal is to walk for 20 minutes 5 days a week. Anther goal is to lose 5% of your body weight.    Ask for support.  Tell friends, family members, and coworkers about your goals. Ask someone to lose weight with you. You may also want to join a weight-loss support group.    Identify foods or triggers that may cause you to overeat.  Remove tempting high-calorie foods from your home and workplace. Place a bowl of fresh fruit on your kitchen counter. If stress causes you to  eat, find other ways to cope with stress. A counselor or therapist may be able to help you.    Track your daily calories and activity.  Write down what you eat and drink. Also write down how many minutes of physical activity you do each day.     Track your weekly weight.  Weigh yourself in the morning, before you eat or drink anything but after you use the bathroom. Use the same scale, in the same place, and in similar clothing each time. Only weigh yourself 1 to 2 times each week, or as directed. You may become discouraged if you weigh yourself every day.    Eating changes:  You will need to eat 500 to 1,000 fewer calories each day than you currently eat to lose 1 to 2 pounds a week. The following changes will help you cut calories:  Eat smaller portions.  Use small plates, no larger than 9 inches in diameter. Fill your plate half full of fruits and vegetables. Measure your food using measuring cups until you know what a serving size looks like.         Eat 3 meals and 1 or 2 snacks each day.  Plan your meals in advance. Cook and eat at home most of the time. Eat slowly. Do not skip meals. Skipping meals can lead to overeating later in the day. This can make it harder for you to lose weight. Talk with a dietitian to help you make a meal plan and schedule that is right for you.    Eat fruits and vegetables at every meal.  They are low in calories and high in fiber, which makes you feel full. Do not add butter, margarine, or cream sauce to vegetables. Use herbs to season steamed vegetables.    Eat less fat and fewer fried foods.  Eat more baked or grilled chicken and fish. These protein sources are lower in calories and fat than red meat. Limit fast food. Dress your salads with olive oil and vinegar instead of bottled dressing.    Limit the amount of sugar you eat.  Do not drink sugary beverages. Limit alcohol.       Activity changes:  Physical activity is good for your body in many ways. It helps you burn calories  and build strong muscles. It decreases stress and depression, and improves your mood. It can also help you sleep better. Talk to your healthcare provider before you begin an exercise program.  Exercise for at least 30 minutes 5 days a week.  Start slowly. Set aside time each day for physical activity that you enjoy and that is convenient for you. It is best to do both weight training and an activity that increases your heart rate, such as walking, bicycling, or swimming.            Find ways to be more active.  Do yard work and housecleaning. Walk up the stairs instead of using elevators. Spend your leisure time going to events that require walking, such as outdoor festivals or fairs. This extra physical activity can help you lose weight and keep it off.       Follow up with your doctor as directed:  You may need to meet with a dietitian. Write down your questions so you remember to ask them during your visits.  © Copyright [employer] 2023 Information is for End User's use only and may not be sold, redistributed or otherwise used for commercial purposes.  The above information is an  only. It is not intended as medical advice for individual conditions or treatments. Talk to your doctor, nurse or pharmacist before following any medical regimen to see if it is safe and effective for you.    Cholesterol and Your Health   AMBULATORY CARE:   Cholesterol  is a waxy, fat-like substance. Your body uses cholesterol to make hormones and new cells, and to protect nerves. Cholesterol is made by your body. It also comes from certain foods you eat, such as meat and dairy products. Your healthcare provider can help you set goals for your cholesterol levels. Your provider can help you create a plan to meet your goals.  Cholesterol level goals:  Your cholesterol level goals depend on your risk for heart disease, your age, and your other health conditions. The following are general guidelines:  Total cholesterol   includes low-density lipoprotein (LDL), high-density lipoprotein (HDL), and triglyceride levels. The total cholesterol level should be lower than 200 mg/dL and is best at about 150 mg/dL.    LDL cholesterol  is called bad cholesterol  because it forms plaque in your arteries. As plaque builds up, your arteries become narrow, and less blood flows through. When plaque decreases blood flow to your heart, you may have chest pain. If plaque completely blocks an artery that brings blood to your heart, you may have a heart attack. Plaque can break off and form blood clots. Blood clots may block arteries in your brain and cause a stroke. The level should be less than 130 mg/dL and is best at about 100 mg/dL.         HDL cholesterol  is called good cholesterol  because it helps remove LDL cholesterol from your arteries. It does this by attaching to LDL cholesterol and carrying it to your liver. Your liver breaks down LDL cholesterol so your body can get rid of it. High levels of HDL cholesterol can help prevent a heart attack and stroke. Low levels of HDL cholesterol can increase your risk for heart disease, heart attack, and stroke. The level should be at least 40 mg/dL in males or at least 50 mg/dL in females.    Triglycerides  are a type of fat that store energy from foods you eat. High levels of triglycerides also cause plaque buildup. This can increase your risk for a heart attack or stroke. If your triglyceride level is high, your LDL cholesterol level may also be high. The level should be less than 150 mg/dL.    Any of the following can increase your risk for high cholesterol:   Smoking or drinking large amounts of alcohol    Having overweight or obesity, or not getting enough exercise    A medical condition such as hypertension (high blood pressure) or diabetes    A family history of high cholesterol    Age older than 65    What you need to know about having your cholesterol levels checked:  Adults 20 to 45 years of  age should have their cholesterol levels checked every 4 to 6 years. Adults 45 years or older should have their cholesterol checked every 1 to 2 years. You may need your cholesterol checked more often, or at a younger age, if you have risk factors for heart disease. You may also need to have your cholesterol checked more often if you have other health conditions, such as diabetes. Blood tests are used to check cholesterol levels. Blood tests measure your levels of triglycerides, LDL cholesterol, and HDL cholesterol.  How healthy fats affect your cholesterol levels:  Healthy fats, also called unsaturated fats, help lower LDL cholesterol and triglyceride levels. Healthy fats include the following:  Monounsaturated fats  are found in foods such as olive oil, canola oil, avocado, nuts, and olives.    Polyunsaturated fats,  such as omega 3 fats, are found in fish, such as salmon, trout, and tuna. They can also be found in plant foods such as flaxseed, walnuts, and soybeans.    How unhealthy fats affect your cholesterol levels:  Unhealthy fats increase LDL cholesterol and triglyceride levels. They are found in foods high in cholesterol, saturated fat, and trans fat:  Cholesterol  is found in eggs, dairy, and meat.    Saturated fat  is found in butter, cheese, ice cream, whole milk, and coconut oil. Saturated fat is also found in meat, such as sausage, hot dogs, and bologna.    Trans fat  is found in liquid oils and is used in fried and baked foods. Foods that contain trans fats include chips, crackers, muffins, sweet rolls, microwave popcorn, and cookies.    Treatment  for high cholesterol will also decrease your risk of heart disease, heart attack, and stroke. Treatment may include any of the following:  Lifestyle changes  may include food, exercise, weight loss, and quitting smoking. You may also need to decrease the amount of alcohol you drink. Your healthcare provider will want you to start with lifestyle changes.  Other treatment may be added if lifestyle changes are not enough. Your healthcare provider may recommend you work with a team to manage hyperlipidemia. The team may include medical experts such as a dietitian, an exercise or physical therapist, and a behavior therapist. Your family members may be included in helping you create lifestyle changes.    Medicines  may be given to lower your LDL cholesterol, triglyceride levels, or total cholesterol level. You may need medicines to lower your cholesterol if any of the following is true:    You have a history of stroke, TIA, unstable angina, or a heart attack.    Your LDL cholesterol level is 190 mg/dL or higher.    You are age 40 to 75 years, have diabetes or heart disease risk factors, and your LDL cholesterol is 70 mg/dL or higher.    Supplements  include fish oil, red yeast rice, and garlic. Fish oil may help lower your triglyceride and LDL cholesterol levels. It may also increase your HDL cholesterol level. Red yeast rice may help decrease your total cholesterol level and LDL cholesterol level. Garlic may help lower your total cholesterol level. Do not take any supplements without talking to your healthcare provider.    Food changes you can make to lower your cholesterol levels:  A dietitian can help you create a healthy eating plan. Your dietitian can show you how to read food labels and choose foods low in saturated fat, trans fats, and cholesterol.     Decrease the total amount of fat you eat.  Choose lean meats, fat-free or 1% fat milk, and low-fat dairy products, such as yogurt and cheese. Try to limit or avoid red meats. Limit or do not eat fried foods or baked goods, such as cookies.    Replace unhealthy fats with healthy fats.  Cook foods in olive oil or canola oil. Choose soft margarines that are low in saturated fat and trans fat. Seeds, nuts, and avocados are other examples of healthy fats.    Eat foods with omega-3 fats.  Examples include salmon, tuna,  mackerel, walnuts, and flaxseed. Eat fish 2 times per week. Pregnant women should not eat fish that have high levels of mercury, such as shark, swordfish, and shreyas mackerel.         Increase the amount of high-fiber foods you eat.  High-fiber foods can help lower your LDL cholesterol. Aim to get between 20 and 30 grams of fiber each day. Fruits and vegetables are high in fiber. Eat at least 5 servings each day. Other high-fiber foods are whole-grain or whole-wheat breads, pastas, or cereals, and brown rice. Eat 3 ounces of whole-grain foods each day. Increase fiber slowly. You may have abdominal discomfort, bloating, and gas if you add fiber to your diet too quickly.         Eat healthy protein foods.  Examples include low-fat dairy products, skinless chicken and turkey, fish, and nuts.    Limit foods and drinks that are high in sugar.  Your dietitian or healthcare provider can help you create daily limits for high-sugar foods and drinks. The limit may be lower if you have diabetes or another health condition. Limits can also help you lose weight if needed.  Lifestyle changes you can make to lower your cholesterol levels:   Maintain a healthy weight.  Ask your healthcare provider what a healthy weight is for you. Ask your provider to help you create a weight loss plan if needed. Weight loss can decrease your total cholesterol and triglyceride levels. Weight loss may also help keep your blood pressure at a healthy level.    Be physically active throughout the day.  Physical activity, such as exercise, can help lower your total cholesterol level and maintain a healthy weight. Physical activity can also help increase your HDL cholesterol level. Work with your healthcare provider to create an program that is right for you. Get at least 30 to 40 minutes of moderate physical activity most days of the week. Examples of exercise include brisk walking, swimming, or biking. Also include strength training at least 2 times each  week. Your healthcare providers can help you create a physical activity plan.            Do not smoke.  Nicotine and other chemicals in cigarettes and cigars can raise your cholesterol levels. Ask your healthcare provider for information if you currently smoke and need help to quit. E-cigarettes or smokeless tobacco still contain nicotine. Talk to your healthcare provider before you use these products.         Limit or do not drink alcohol.  Alcohol can increase your triglyceride levels. Ask your healthcare provider before you drink alcohol. Ask how much is okay for you to drink in 24 hours or 1 week.    Follow up with your doctor as directed:  Write down your questions so you remember to ask them during your visits.  © Copyright [employer] 2023 Information is for End User's use only and may not be sold, redistributed or otherwise used for commercial purposes.  The above information is an  only. It is not intended as medical advice for individual conditions or treatments. Talk to your doctor, nurse or pharmacist before following any medical regimen to see if it is safe and effective for you.

## 2024-03-04 NOTE — ASSESSMENT & PLAN NOTE
Minor swelling and irritation.  Recommend keeping the stools softer by increasing fiber in the diet, increasing water intake.  If it becomes worse or more painful, would consider follow-up with colorectal surgery.

## 2024-03-11 NOTE — PROGRESS NOTES
Assessment and Plan:    Problem List Items Addressed This Visit     Chest pain on respiration - Primary     Chest x-ray was normal   At this point, the patient continues to have some problems with deep inspiration  Would recommend PFTs  If they are normal, consider pulmonary verses CT scan, though I would lean towards pulmonary  Differential included in PFTs would be asthma, COPD, other restrictive lung diseases  Relevant Orders    Complete PFT without post bronchodilator                 Diagnoses and all orders for this visit:    Chest pain on respiration  -     Complete PFT without post bronchodilator; Future              Subjective:      Patient ID: Emerson Alicea is a 22 y o  male  CC:    Chief Complaint   Patient presents with    Follow-up     pt states only notices when taking deep breaths  mgb       HPI:    Chest pain is OK  Only notes when he htakes deep breath  The following portions of the patient's history were reviewed and updated as appropriate: allergies, current medications, past family history, past medical history, past social history, past surgical history and problem list       Review of Systems   Constitutional: Negative  HENT: Negative  Respiratory:        Per HPI           Data to review:       Objective:    Vitals:    01/31/22 1539   BP: 114/60   BP Location: Left arm   Patient Position: Sitting   Cuff Size: Large   Temp: 97 7 °F (36 5 °C)   TempSrc: Temporal   Weight: 126 kg (278 lb 8 oz)   Height: 5' 11" (1 803 m)        Physical Exam  Vitals and nursing note reviewed 
Fair

## 2024-04-26 ENCOUNTER — OFFICE VISIT (OUTPATIENT)
Dept: FAMILY MEDICINE CLINIC | Facility: CLINIC | Age: 24
End: 2024-04-26
Payer: COMMERCIAL

## 2024-04-26 ENCOUNTER — APPOINTMENT (OUTPATIENT)
Dept: RADIOLOGY | Facility: MEDICAL CENTER | Age: 24
End: 2024-04-26
Payer: COMMERCIAL

## 2024-04-26 VITALS
HEART RATE: 80 BPM | HEIGHT: 71 IN | WEIGHT: 277 LBS | BODY MASS INDEX: 38.78 KG/M2 | SYSTOLIC BLOOD PRESSURE: 110 MMHG | DIASTOLIC BLOOD PRESSURE: 68 MMHG

## 2024-04-26 DIAGNOSIS — F32.A ANXIETY AND DEPRESSION: ICD-10-CM

## 2024-04-26 DIAGNOSIS — M25.561 ACUTE PAIN OF RIGHT KNEE: ICD-10-CM

## 2024-04-26 DIAGNOSIS — J30.2 SEASONAL ALLERGIC RHINITIS, UNSPECIFIED TRIGGER: ICD-10-CM

## 2024-04-26 DIAGNOSIS — M25.561 ACUTE PAIN OF RIGHT KNEE: Primary | ICD-10-CM

## 2024-04-26 DIAGNOSIS — F41.9 ANXIETY AND DEPRESSION: ICD-10-CM

## 2024-04-26 DIAGNOSIS — E66.09 CLASS 2 OBESITY DUE TO EXCESS CALORIES WITHOUT SERIOUS COMORBIDITY WITH BODY MASS INDEX (BMI) OF 38.0 TO 38.9 IN ADULT: ICD-10-CM

## 2024-04-26 DIAGNOSIS — M54.6 ACUTE BILATERAL THORACIC BACK PAIN: ICD-10-CM

## 2024-04-26 PROCEDURE — 99214 OFFICE O/P EST MOD 30 MIN: CPT | Performed by: FAMILY MEDICINE

## 2024-04-26 PROCEDURE — 73562 X-RAY EXAM OF KNEE 3: CPT

## 2024-04-26 RX ORDER — NAPROXEN SODIUM 220 MG
440 TABLET ORAL 2 TIMES DAILY WITH MEALS
Start: 2024-04-26 | End: 2024-05-26

## 2024-04-26 NOTE — PROGRESS NOTES
Name: Edgardo Jj      : 2000      MRN: [id number]  Encounter Provider: Rohith Zapata MD  Encounter Date: 2024   Encounter department: [facility]    Assessment & Plan     1. Acute pain of right knee  Comments:  Appears related to inactivity.  Check x-ray.  Naproxen 2 pills twice a day for 1 month.  Ice, heat, range of motion.  Orders:  -     XR knee 3 vw right non injury; Future; Expected date: 2024  -     naproxen sodium (ALEVE) 220 MG tablet; Take 2 tablets (440 mg total) by mouth 2 (two) times a day with meals    2. Acute bilateral thoracic back pain  Comments:  Range of motion, heat, ice.    3. Anxiety and depression  Assessment & Plan:  Patient has been relatively stable, but increased stress lately.  He is requesting to follow-up with a psychiatrist.  I felt that the psychiatric residency program would make the most sense, as it would get him in sooner and have another evaluation of his current treatment and condition.  Completely agree with continuing on counseling as well.      Orders:  -     Ambulatory referral to Psych Services; Future    4. Class 2 obesity due to excess calories without serious comorbidity with body mass index (BMI) of 38.0 to 38.9 in adult  Assessment & Plan:  Patient's BMI is down from prior, but he still has some issues with this.  He would like to consider further evaluation.  For the time being, we will rule out any significant problems with his knee, and then encourage increased activity.  Would certainly consider follow-up with bariatric medical program.      5. Seasonal allergic rhinitis, unspecified trigger  Assessment & Plan:  Patient does have increased allergy symptoms.  Consider Flonase, Claritin.  Could also look at Zyrtec, Xyzal, Clarinex, Allegra.  These are all over-the-counter.               Subjective      Chief Complaint   Patient presents with   • Allergies   • Obesity     Wants to discuss weight loss optons  "  • Back Pain     Lower back pain. Onset Monday.   • Knee Pain     Right knee pain. Onset a couple weeks.   • Depression     He will see counselor next week. But wants a Psychiatry referral.       Patient is here to follow-up on multiple issues.    Has been having some back pain, knee pain.  Low back pain bilaterally.  Quite significant.  Started last Monday.  Advil seems to help, but only a bit.    Right knee pain: Patient is a new job.  This involves sitting at a desk for longer periods of time.  When he goes to get up, the right knee feels much more stiff and problematic for him.  Again, this is since the new job that he has.  No locking, no giving out.    depression: Patient would like to see psychiatry.  He is having some issues with this, and does see counseling, but he would also like to have a second opinion with psychiatry.    Obesity: Patient was quite concerned because he is gaining weight.  His job is more sedentary currently.  He has been trying to increase his activity level, but it is somewhat difficult with his knee pain.  He is also trying to pay attention to diet, but is in the beginning stages of that as well.  He wondered if there was anything that he could do for that, including different dietary changes, exercise plans, other providers that he could talk with, medication options.          Review of Systems   Constitutional: Negative.    HENT: Negative.     Respiratory: Negative.     Cardiovascular: Negative.    Gastrointestinal: Negative.    Genitourinary: Negative.    Musculoskeletal:         Per HPI   Skin: Negative.    Psychiatric/Behavioral:  Positive for dysphoric mood. Negative for suicidal ideas. The patient is nervous/anxious.        Current Outpatient Medications on File Prior to Visit   Medication Sig   • sertraline (ZOLOFT) 100 mg tablet Take 1.5 tablets (150 mg total) by mouth daily       Objective     /68   Pulse 80   Ht 5' 11\" (1.803 m)   Wt 126 kg (277 lb)   BMI 38.63 " kg/m²     Physical Exam  Vitals and nursing note reviewed.   Constitutional:       Appearance: Normal appearance. He is well-developed.   HENT:      Head: Normocephalic and atraumatic.   Cardiovascular:      Rate and Rhythm: Normal rate and regular rhythm.      Pulses:           Carotid pulses are 2+ on the right side and 2+ on the left side.     Heart sounds: Normal heart sounds. No murmur heard.     No friction rub. No gallop.   Pulmonary:      Effort: Pulmonary effort is normal. No respiratory distress.      Breath sounds: Normal breath sounds. No wheezing or rales.   Musculoskeletal:      Cervical back: Normal range of motion and neck supple.      Thoracic back: No spasms or tenderness. Normal range of motion.      Right knee: Normal.      Left knee: Normal.   Neurological:      Mental Status: He is alert.       Rohith Zapata MD

## 2024-04-26 NOTE — ASSESSMENT & PLAN NOTE
Patient does have increased allergy symptoms.  Consider Flonase, Claritin.  Could also look at Zyrtec, Xyzal, Clarinex, Allegra.  These are all over-the-counter.

## 2024-04-26 NOTE — ASSESSMENT & PLAN NOTE
Patient's BMI is down from prior, but he still has some issues with this.  He would like to consider further evaluation.  For the time being, we will rule out any significant problems with his knee, and then encourage increased activity.  Would certainly consider follow-up with bariatric medical program.

## 2024-04-26 NOTE — PATIENT INSTRUCTIONS
1. Acute pain of right knee  Comments:  Appears related to inactivity.  Check x-ray.  Naproxen 2 pills twice a day for 1 month.  Ice, heat, range of motion.  Orders:  -     XR knee 3 vw right non injury; Future; Expected date: 04/26/2024  -     naproxen sodium (ALEVE) 220 MG tablet; Take 2 tablets (440 mg total) by mouth 2 (two) times a day with meals    2. Acute bilateral thoracic back pain  Comments:  Range of motion, heat, ice.    3. Anxiety and depression  Assessment & Plan:  Patient has been relatively stable, but increased stress lately.  He is requesting to follow-up with a psychiatrist.  I felt that the psychiatric residency program would make the most sense, as it would get him in sooner and have another evaluation of his current treatment and condition.  Completely agree with continuing on counseling as well.      Orders:  -     Ambulatory referral to Psych Services; Future    4. Class 2 obesity due to excess calories without serious comorbidity with body mass index (BMI) of 38.0 to 38.9 in adult  Assessment & Plan:  Patient's BMI is down from prior, but he still has some issues with this.  He would like to consider further evaluation.  For the time being, we will rule out any significant problems with his knee, and then encourage increased activity.  Would certainly consider follow-up with bariatric medical program.      5. Seasonal allergic rhinitis, unspecified trigger  Assessment & Plan:  Patient does have increased allergy symptoms.  Consider Flonase, Claritin.  Could also look at Zyrtec, Xyzal, Clarinex, Allegra.  These are all over-the-counter.            COVID 19 Instructions    Edgardo Jj was advised to limit contact with others to essential tasks such as getting food, medications, and medical care.    Proper handwashing reviewed, and Hand sanitzer when washing is not available.    If the patient develops symptoms of COVID 19, the patient should call the office as soon as possible.    It  is strongly recommended that Flu Vaccinations be obtained.      Virtual Visits:  Kilo: This works on smart phones (any phone with Internet browsing capability).  You should get a text message when the provider is ready to see you.  Click on the link in the text message, and the call should start.  You will need to type in your name, and allow camera and microphone access.  This is HIPPA compliant, and secure.      If you have not already done so, get immunized to COVID 19.      We are committed to getting you vaccinated as soon as possible and will be closely following CDC and [facility] guidelines as they are released and revised.  Please refer to our COVID-19 vaccine webpage for the most up to date information on the vaccine and our distribution efforts.    This site will also have the most up to date recommendations for COVID booster vaccine.    https://[email or web address].[email or web address]/covid-19/protect-yourself/covid-[email or web address]-vaccine    Call 1-209-CMVWIJZ ([phone]), option 7    You can also visit https://www.vaccines.gov/ to find vaccines in your area.    OUR LOCATION:    [facility]  [address] [place] [address], [address]  [place], [place], [address] [phone]  Fax: [phone]    Lab services, Rheumatology, and OB/GYN are at this location as well.

## 2024-04-26 NOTE — ASSESSMENT & PLAN NOTE
Patient has been relatively stable, but increased stress lately.  He is requesting to follow-up with a psychiatrist.  I felt that the psychiatric residency program would make the most sense, as it would get him in sooner and have another evaluation of his current treatment and condition.  Completely agree with continuing on counseling as well.

## 2024-04-29 NOTE — RESULT ENCOUNTER NOTE
Tests were not normal, and should follow at  your scheduled Office visit. Please call about this, if [employer] message is not available or not read by patient.

## 2024-04-30 ENCOUNTER — TELEPHONE (OUTPATIENT)
Dept: PSYCHIATRY | Facility: CLINIC | Age: 24
End: 2024-04-30

## 2024-04-30 NOTE — TELEPHONE ENCOUNTER
Patient has been added to the Medication Management wait list with a referral.    Insurance: Blue Cross  Insurance Type:    Commercial [x]   Medicaid []   [facility] (if applicable)   Medicare []  Location Preference: [place]  Provider Preference: None  Virtual: Yes [x] No []  Were outside resources sent: Yes [] No [x]    Presenting Problem  Depression   Anxiety

## 2024-05-22 ENCOUNTER — RA CDI HCC (OUTPATIENT)
Dept: OTHER | Facility: HOSPITAL | Age: 24
End: 2024-05-22

## 2024-05-22 NOTE — PROGRESS NOTES
HCC coding opportunities       Chart reviewed, no opportunity found: CHART REVIEWED, NO OPPORTUNITY FOUND        Patients Insurance        Commercial Insurance: [employer]

## 2024-05-25 ENCOUNTER — LAB (OUTPATIENT)
Dept: LAB | Facility: MEDICAL CENTER | Age: 24
End: 2024-05-25
Payer: COMMERCIAL

## 2024-05-25 DIAGNOSIS — K64.9 HEMORRHOIDS, UNSPECIFIED HEMORRHOID TYPE: ICD-10-CM

## 2024-05-25 DIAGNOSIS — F41.9 ANXIETY AND DEPRESSION: ICD-10-CM

## 2024-05-25 DIAGNOSIS — Z13.220 LIPID SCREENING: ICD-10-CM

## 2024-05-25 DIAGNOSIS — F32.A ANXIETY AND DEPRESSION: ICD-10-CM

## 2024-05-25 LAB
ALBUMIN SERPL BCP-MCNC: 4.5 G/DL (ref 3.5–5)
ALP SERPL-CCNC: 61 U/L (ref 34–104)
ALT SERPL W P-5'-P-CCNC: 15 U/L (ref 7–52)
ANION GAP SERPL CALCULATED.3IONS-SCNC: 10 MMOL/L (ref 4–13)
AST SERPL W P-5'-P-CCNC: 13 U/L (ref 13–39)
BASOPHILS # BLD AUTO: 0.02 THOUSANDS/ÂΜL (ref 0–0.1)
BASOPHILS NFR BLD AUTO: 0 % (ref 0–1)
BILIRUB SERPL-MCNC: 0.48 MG/DL (ref 0.2–1)
BUN SERPL-MCNC: 14 MG/DL (ref 5–25)
CALCIUM SERPL-MCNC: 9.3 MG/DL (ref 8.4–10.2)
CHLORIDE SERPL-SCNC: 101 MMOL/L (ref 96–108)
CHOLEST SERPL-MCNC: 203 MG/DL
CO2 SERPL-SCNC: 28 MMOL/L (ref 21–32)
CREAT SERPL-MCNC: 0.68 MG/DL (ref 0.6–1.3)
EOSINOPHIL # BLD AUTO: 0.15 THOUSAND/ÂΜL (ref 0–0.61)
EOSINOPHIL NFR BLD AUTO: 2 % (ref 0–6)
ERYTHROCYTE [DISTWIDTH] IN BLOOD BY AUTOMATED COUNT: 13.4 % (ref 11.6–15.1)
GFR SERPL CREATININE-BSD FRML MDRD: 135 ML/MIN/1.73SQ M
GLUCOSE P FAST SERPL-MCNC: 99 MG/DL (ref 65–99)
HCT VFR BLD AUTO: 42.6 % (ref 36.5–49.3)
HDLC SERPL-MCNC: 46 MG/DL
HGB BLD-MCNC: 13.5 G/DL (ref 12–17)
IMM GRANULOCYTES # BLD AUTO: 0.04 THOUSAND/UL (ref 0–0.2)
IMM GRANULOCYTES NFR BLD AUTO: 1 % (ref 0–2)
LDLC SERPL CALC-MCNC: 124 MG/DL (ref 0–100)
LYMPHOCYTES # BLD AUTO: 2.23 THOUSANDS/ÂΜL (ref 0.6–4.47)
LYMPHOCYTES NFR BLD AUTO: 31 % (ref 14–44)
MCH RBC QN AUTO: 28 PG (ref 26.8–34.3)
MCHC RBC AUTO-ENTMCNC: 31.7 G/DL (ref 31.4–37.4)
MCV RBC AUTO: 88 FL (ref 82–98)
MONOCYTES # BLD AUTO: 0.54 THOUSAND/ÂΜL (ref 0.17–1.22)
MONOCYTES NFR BLD AUTO: 7 % (ref 4–12)
NEUTROPHILS # BLD AUTO: 4.34 THOUSANDS/ÂΜL (ref 1.85–7.62)
NEUTS SEG NFR BLD AUTO: 59 % (ref 43–75)
NRBC BLD AUTO-RTO: 0 /100 WBCS
PLATELET # BLD AUTO: 277 THOUSANDS/UL (ref 149–390)
PMV BLD AUTO: 11.2 FL (ref 8.9–12.7)
POTASSIUM SERPL-SCNC: 4.4 MMOL/L (ref 3.5–5.3)
PROT SERPL-MCNC: 7.2 G/DL (ref 6.4–8.4)
RBC # BLD AUTO: 4.82 MILLION/UL (ref 3.88–5.62)
SODIUM SERPL-SCNC: 139 MMOL/L (ref 135–147)
TRIGL SERPL-MCNC: 166 MG/DL
TSH SERPL DL<=0.05 MIU/L-ACNC: 1.1 UIU/ML (ref 0.45–4.5)
WBC # BLD AUTO: 7.32 THOUSAND/UL (ref 4.31–10.16)

## 2024-05-25 PROCEDURE — 80061 LIPID PANEL: CPT

## 2024-05-25 PROCEDURE — 80053 COMPREHEN METABOLIC PANEL: CPT

## 2024-05-25 PROCEDURE — 85025 COMPLETE CBC W/AUTO DIFF WBC: CPT

## 2024-05-25 PROCEDURE — 84443 ASSAY THYROID STIM HORMONE: CPT

## 2024-05-25 PROCEDURE — 36415 COLL VENOUS BLD VENIPUNCTURE: CPT

## 2024-05-28 ENCOUNTER — TELEPHONE (OUTPATIENT)
Dept: FAMILY MEDICINE CLINIC | Facility: CLINIC | Age: 24
End: 2024-05-28

## 2024-05-28 NOTE — RESULT ENCOUNTER NOTE
Tests were not normal, and should follow at  your scheduled Office visit. Please call about this, if [employer] message is not available or not read by patient.

## 2024-05-28 NOTE — TELEPHONE ENCOUNTER
----- Message from Rohith Zapata MD sent at 5/28/2024  3:11 PM EDT -----  Tests were not normal, and should follow at  your scheduled Office visit. Please call about this, if [employer] message is not available or not read by patient.

## 2024-06-03 ENCOUNTER — OFFICE VISIT (OUTPATIENT)
Dept: FAMILY MEDICINE CLINIC | Facility: CLINIC | Age: 24
End: 2024-06-03
Payer: COMMERCIAL

## 2024-06-03 ENCOUNTER — TELEPHONE (OUTPATIENT)
Dept: PSYCHIATRY | Facility: CLINIC | Age: 24
End: 2024-06-03

## 2024-06-03 VITALS
WEIGHT: 272.8 LBS | SYSTOLIC BLOOD PRESSURE: 106 MMHG | HEART RATE: 93 BPM | HEIGHT: 71 IN | BODY MASS INDEX: 38.19 KG/M2 | OXYGEN SATURATION: 98 % | DIASTOLIC BLOOD PRESSURE: 60 MMHG

## 2024-06-03 DIAGNOSIS — F34.1 DYSTHYMIA: ICD-10-CM

## 2024-06-03 DIAGNOSIS — R11.0 NAUSEA: ICD-10-CM

## 2024-06-03 DIAGNOSIS — F33.2 SEVERE EPISODE OF RECURRENT MAJOR DEPRESSIVE DISORDER, WITHOUT PSYCHOTIC FEATURES (HCC): Primary | ICD-10-CM

## 2024-06-03 PROCEDURE — 99214 OFFICE O/P EST MOD 30 MIN: CPT | Performed by: FAMILY MEDICINE

## 2024-06-03 RX ORDER — BUPROPION HYDROCHLORIDE 150 MG/1
150 TABLET ORAL EVERY MORNING
Qty: 30 TABLET | Refills: 5 | Status: SHIPPED | OUTPATIENT
Start: 2024-06-03 | End: 2024-11-30

## 2024-06-03 RX ORDER — SERTRALINE HYDROCHLORIDE 100 MG/1
200 TABLET, FILM COATED ORAL DAILY
Qty: 180 TABLET | Refills: 0 | Status: SHIPPED | OUTPATIENT
Start: 2024-06-03 | End: 2024-09-01

## 2024-06-03 NOTE — TELEPHONE ENCOUNTER
----- Message from Rohith Zapata MD sent at 6/3/2024  8:48 AM EDT -----  Psychiatry Resident Consult:    The patient has been identified by the provider as needing Psychiatry services.  After discussion with the patient, the provider and patient agree to a consult with a psychiatry resident.  The patient understands that this is a single visit, and that ongoing care will be with the PCP.  The patient will be contacted by psychiatry intake to schedule the consult.  The provider acknowledged that the consult is one time, not ongoing care.  The provider will then take care of the patient after the consult.

## 2024-06-03 NOTE — ASSESSMENT & PLAN NOTE
No SI, but thoughts that it would be easier if he was not here.  Not getting relief from Zoloft 150.  Will increase to 200, and add Welbutrin.    Patient likely does not need to be hospitalized at this point.  Just need some more assistance.  Is seeing therapist.    Psychiatry Resident Consult:    The patient has been identified by the provider as needing Psychiatry services.  After discussion with the patient, the provider and patient agree to a consult with a psychiatry resident.  The patient understands that this is a single visit, and that ongoing care will be with the PCP.  The patient will be contacted by psychiatry intake to schedule the consult.  The provider acknowledged that the consult is one time, not ongoing care.  The provider will then take care of the patient after the consult.

## 2024-06-03 NOTE — PATIENT INSTRUCTIONS
1. Anxiety and depression  Assessment & Plan:  No SI, but thoughts that it would be easier if he was not here.  Not getting relief from Zoloft 150.  Will increase to 200, and add Welbutrin.    Patient likely does not need to be hospitalized at this point.  Just need some more assistance.  Is seeing therapist.    Psychiatry Resident Consult:    The patient has been identified by the provider as needing Psychiatry services.  After discussion with the patient, the provider and patient agree to a consult with a psychiatry resident.  The patient understands that this is a single visit, and that ongoing care will be with the PCP.  The patient will be contacted by psychiatry intake to schedule the consult.  The provider acknowledged that the consult is one time, not ongoing care.  The provider will then take care of the patient after the consult.      2. Dysthymia  Assessment & Plan:  Dysthymia seems to the changed to now major depression.  Again, would recommend follow-up with psychiatry.  Will reenter referral, as I did ask for the resident program, rather than just moving into regular clinical treatment.      COVID 19 Instructions    Edgardo Jj was advised to limit contact with others to essential tasks such as getting food, medications, and medical care.    Proper handwashing reviewed, and Hand sanitzer when washing is not available.    If the patient develops symptoms of COVID 19, the patient should call the office as soon as possible.    It is strongly recommended that Flu Vaccinations be obtained.      Virtual Visits:  Kilo: This works on smart phones (any phone with Internet browsing capability).  You should get a text message when the provider is ready to see you.  Click on the link in the text message, and the call should start.  You will need to type in your name, and allow camera and microphone access.  This is HIPPA compliant, and secure.      If you have not already done so, get immunized to COVID  19.      We are committed to getting you vaccinated as soon as possible and will be closely following [facility] and [facility] guidelines as they are released and revised.  Please refer to our COVID-19 vaccine webpage for the most up to date information on the vaccine and our distribution efforts.    This site will also have the most up to date recommendations for COVID booster vaccine.    [email or web address]-19/protect-[email or web address]/covid-[email or web address]-vaccine    Call [phone] ([phone]), option 7    You can also visit https://www.vaccines.gov/ to find vaccines in your area.    OUR LOCATION:    [facility]  [address] [place] [address], [address]  [place], [place], [address] [phone]  Fax: [phone]    Lab services, Rheumatology, and OB/GYN are at this location as well.

## 2024-06-03 NOTE — PROGRESS NOTES
Ambulatory Visit  Name: Edgardo Jj      : 2000      MRN: [id number]  Encounter Provider: Rohith Zapata MD  Encounter Date: 6/3/2024   Encounter department: [facility]    Assessment & Plan   1. Severe episode of recurrent major depressive disorder, without psychotic features (HCC)  Assessment & Plan:  No SI, but thoughts that it would be easier if he was not here.  Not getting relief from Zoloft 150.  Will increase to 200, and add Welbutrin.    Patient likely does not need to be hospitalized at this point.  Just need some more assistance.  Is seeing therapist.    Psychiatry Resident Consult:    The patient has been identified by the provider as needing Psychiatry services.  After discussion with the patient, the provider and patient agree to a consult with a psychiatry resident.  The patient understands that this is a single visit, and that ongoing care will be with the PCP.  The patient will be contacted by psychiatry intake to schedule the consult.  The provider acknowledged that the consult is one time, not ongoing care.  The provider will then take care of the patient after the consult.      Orders:  -     sertraline (ZOLOFT) 100 mg tablet; Take 2 tablets (200 mg total) by mouth daily  -     buPROPion (WELLBUTRIN XL) 150 mg 24 hr tablet; Take 1 tablet (150 mg total) by mouth every morning  -     Ambulatory referral to Psych Services; Future  2. Dysthymia  Assessment & Plan:  Dysthymia seems to the changed to now major depression.  Again, would recommend follow-up with psychiatry.  Will reenter referral, as I did ask for the resident program, rather than just moving into regular clinical treatment.  3. Nausea  Assessment & Plan:  Diet related more than not. Seems to be timing of meals.  Not on meds.  No change with Naproxen before.        Depression Screening and Follow-up Plan: Patient's depression screening was positive with a PHQ-9 score of 11. Continue regular  "follow-up with their mental health provider who is managing their mental health condition(s).       History of Present Illness     Patient is here to follow-up on multiple issues.    Knees: Feeling better with naproxen.  Did have an x-ray showing minor effusion of the right knee.  Suprapatellar.  Again, feeling much better after using naproxen.    Depression: Not better yet.  Some days OK, and others can't think about anything more than sad.  Sometimes feels like he does not want to exist.          Review of Systems   Constitutional: Negative.    HENT: Negative.     Respiratory: Negative.     Cardiovascular: Negative.    Gastrointestinal:  Positive for nausea.   Psychiatric/Behavioral:  Positive for dysphoric mood and sleep disturbance (problems staying alseep at times.). Negative for self-injury and suicidal ideas. The patient is nervous/anxious.        Objective     /60 (BP Location: Left arm, Patient Position: Sitting, Cuff Size: Standard)   Pulse 93   Ht 5' 11\" (1.803 m)   Wt 124 kg (272 lb 12.8 oz)   SpO2 98%   BMI 38.05 kg/m²     Physical Exam  Vitals and nursing note reviewed.   Constitutional:       Appearance: Normal appearance. He is well-developed.   HENT:      Head: Normocephalic and atraumatic.   Cardiovascular:      Rate and Rhythm: Normal rate and regular rhythm.      Pulses:           Carotid pulses are 2+ on the right side and 2+ on the left side.     Heart sounds: Normal heart sounds. No murmur heard.     No friction rub. No gallop.   Pulmonary:      Effort: Pulmonary effort is normal. No respiratory distress.      Breath sounds: Normal breath sounds. No wheezing or rales.   Musculoskeletal:      Cervical back: Normal range of motion and neck supple.   Neurological:      Mental Status: He is alert.       Administrative Statements       PHQ-9 Depression Screening    Little interest or pleasure in doing things: 0 - not at all  Feeling down, depressed, or hopeless: 2 - more than half the " days  Trouble falling or staying asleep, or sleeping too much: 1 - several days  Feeling tired or having little energy: 2 - more than half the days  Poor appetite or overeatin - not at all  Feeling bad about yourself - or that you are a failure or have let yourself or your family down: 2 - more than half the days  Trouble concentrating on things, such as reading the newspaper or watching television: 2 - more than half the days  Moving or speaking so slowly that other people could have noticed. Or the opposite - being so fidgety or restless that you have been moving around a lot more than usual: 1 - several days  Thoughts that you would be better off dead, or of hurting yourself in some way: 1 - several days  PHQ-9 Score: 11  Score Interpretation: Moderate depression

## 2024-06-03 NOTE — ASSESSMENT & PLAN NOTE
Diet related more than not. Seems to be timing of meals.  Not on meds.  No change with Naproxen before.

## 2024-06-03 NOTE — ASSESSMENT & PLAN NOTE
Dysthymia seems to the changed to now major depression.  Again, would recommend follow-up with psychiatry.  Will reenter referral, as I did ask for the resident program, rather than just moving into regular clinical treatment.

## 2024-06-03 NOTE — Clinical Note
Psychiatry Resident Consult:  The patient has been identified by the provider as needing Psychiatry services. After discussion with the patient, the provider and patient agree to a consult with a psychiatry resident. The patient understands that this is a single visit, and that ongoing care will be with the PCP. The patient will be contacted by psychiatry intake to schedule the consult. The provider acknowledged that the consult is one time, not ongoing care. The provider will then take care of the patient after the consult.

## 2024-06-10 NOTE — TELEPHONE ENCOUNTER
Contacted patient in regards to PCP One-Time Consult to verify needs of services in attempts to offer patient appointment with our Resident Clinic. spoke with patient whom stated they were interested.    Pt scheduled on 6/14 at 8am with Dr. Garvey.

## 2024-06-14 ENCOUNTER — DOCUMENTATION (OUTPATIENT)
Dept: FAMILY MEDICINE CLINIC | Facility: CLINIC | Age: 24
End: 2024-06-14

## 2024-06-14 ENCOUNTER — OFFICE VISIT (OUTPATIENT)
Dept: PSYCHIATRY | Facility: CLINIC | Age: 24
End: 2024-06-14

## 2024-06-14 DIAGNOSIS — F32.1 CURRENT MODERATE EPISODE OF MAJOR DEPRESSIVE DISORDER, UNSPECIFIED WHETHER RECURRENT (HCC): Primary | ICD-10-CM

## 2024-06-14 DIAGNOSIS — F41.1 GAD (GENERALIZED ANXIETY DISORDER): ICD-10-CM

## 2024-06-14 PROBLEM — F34.1 DYSTHYMIA: Status: RESOLVED | Noted: 2020-10-06 | Resolved: 2024-06-14
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
## 2024-06-14 NOTE — PROGRESS NOTES
1. Current moderate episode of major depressive disorder, unspecified whether recurrent (Formerly [facility])  Assessment & Plan:  Reviewed the one-time consult from psychiatry.  They felt that the medication was appropriate with Wellbutrin and Zoloft at 200 mg.  In several months if he is not having effect from this, can transition from Zoloft to Lexapro or Prozac.  As far as insomnia was concerned that would go along with this, was recommended to start trazodone 50 mg, titrating upwards to 200.  If that was not effective enough, could consider using gabapentin.    Laboratory studies were recommended as well.  This was ordered by psychiatry, but certainly based on the single follow-up method that we are using with this, it would be reasonable for primary care to follow-up on the results.  No other changes recommended at the moment.

## 2024-06-14 NOTE — ASSESSMENT & PLAN NOTE
Reviewed the one-time consult from psychiatry.  They felt that the medication was appropriate with Wellbutrin and Zoloft at 200 mg.  In several months if he is not having effect from this, can transition from Zoloft to Lexapro or Prozac.  As far as insomnia was concerned that would go along with this, was recommended to start trazodone 50 mg, titrating upwards to 200.  If that was not effective enough, could consider using gabapentin.    Laboratory studies were recommended as well.  This was ordered by psychiatry, but certainly based on the single follow-up method that we are using with this, it would be reasonable for primary care to follow-up on the results.  No other changes recommended at the moment.

## 2024-06-17 ENCOUNTER — OFFICE VISIT (OUTPATIENT)
Dept: FAMILY MEDICINE CLINIC | Facility: CLINIC | Age: 24
End: 2024-06-17
Payer: COMMERCIAL

## 2024-06-17 VITALS
OXYGEN SATURATION: 98 % | DIASTOLIC BLOOD PRESSURE: 74 MMHG | WEIGHT: 276.25 LBS | BODY MASS INDEX: 38.53 KG/M2 | SYSTOLIC BLOOD PRESSURE: 118 MMHG | HEART RATE: 75 BPM | TEMPERATURE: 96.5 F

## 2024-06-17 DIAGNOSIS — E66.09 CLASS 2 OBESITY DUE TO EXCESS CALORIES WITHOUT SERIOUS COMORBIDITY WITH BODY MASS INDEX (BMI) OF 38.0 TO 38.9 IN ADULT: ICD-10-CM

## 2024-06-17 DIAGNOSIS — H61.23 CERUMEN DEBRIS ON TYMPANIC MEMBRANE OF BOTH EARS: ICD-10-CM

## 2024-06-17 DIAGNOSIS — F33.1 MODERATE EPISODE OF RECURRENT MAJOR DEPRESSIVE DISORDER (HCC): Primary | ICD-10-CM

## 2024-06-17 DIAGNOSIS — H69.93 DYSFUNCTION OF BOTH EUSTACHIAN TUBES: ICD-10-CM

## 2024-06-17 DIAGNOSIS — F99 INSOMNIA DUE TO OTHER MENTAL DISORDER: ICD-10-CM

## 2024-06-17 DIAGNOSIS — F51.05 INSOMNIA DUE TO OTHER MENTAL DISORDER: ICD-10-CM

## 2024-06-17 DIAGNOSIS — J30.2 SEASONAL ALLERGIC RHINITIS, UNSPECIFIED TRIGGER: ICD-10-CM

## 2024-06-17 PROBLEM — G47.00 INSOMNIA: Status: ACTIVE | Noted: 2024-06-17

## 2024-06-17 PROBLEM — R07.1 CHEST PAIN ON RESPIRATION: Status: RESOLVED | Noted: 2022-01-17 | Resolved: 2024-06-17

## 2024-06-17 PROCEDURE — 99214 OFFICE O/P EST MOD 30 MIN: CPT | Performed by: FAMILY MEDICINE

## 2024-06-17 RX ORDER — FLUTICASONE PROPIONATE 50 MCG
2 SPRAY, SUSPENSION (ML) NASAL DAILY
COMMUNITY

## 2024-06-17 RX ORDER — TRAZODONE HYDROCHLORIDE 50 MG/1
50 TABLET ORAL
Qty: 30 TABLET | Refills: 5 | Status: SHIPPED | OUTPATIENT
Start: 2024-06-17

## 2024-06-17 RX ORDER — LORATADINE 10 MG/1
10 TABLET ORAL DAILY
COMMUNITY

## 2024-06-17 RX ORDER — MONTELUKAST SODIUM 10 MG/1
10 TABLET ORAL
Qty: 30 TABLET | Refills: 5 | Status: SHIPPED | OUTPATIENT
Start: 2024-06-17

## 2024-06-17 NOTE — ASSESSMENT & PLAN NOTE
Did see Psych on Friday for the consult.  Psychiatry felt that the Zoloft and Wellbutrin were reasonable, but would add trazodone for sleep.  Could increase up to 200 of the trazodone, starting at 50 mg and increasing weekly by 50.    Laboratory studies are pending at this time.    Discussed with patient about increasing the Wellbutrin, which certainly could be done as well, but with the addition of trazodone we elected to stay with the current dosage of Zoloft and Wellbutrin.

## 2024-06-17 NOTE — ASSESSMENT & PLAN NOTE
Per recent psychiatry consult, they recommended starting trazodone, and can increase to 200 mg daily, titrating up by 50 mg weekly, if the patient is not having effect.  If that is not effective, they mentioned the possibility of using gabapentin.

## 2024-06-17 NOTE — PATIENT INSTRUCTIONS
1. Moderate episode of recurrent major depressive disorder (HCC)  Assessment & Plan:  Did see Psych on Friday for the consult.  Psychiatry felt that the Zoloft and Wellbutrin were reasonable, but would add trazodone for sleep.  Could increase up to 200 of the trazodone, starting at 50 mg and increasing weekly by 50.    Laboratory studies are pending at this time.    Discussed with patient about increasing the Wellbutrin, which certainly could be done as well, but with the addition of trazodone we elected to stay with the current dosage of Zoloft and Wellbutrin.  Orders:  -     traZODone (DESYREL) 50 mg tablet; Take 1 tablet (50 mg total) by mouth daily at bedtime  2. Insomnia due to other mental disorder  Assessment & Plan:  Per recent psychiatry consult, they recommended starting trazodone, and can increase to 200 mg daily, titrating up by 50 mg weekly, if the patient is not having effect.  If that is not effective, they mentioned the possibility of using gabapentin.  Orders:  -     traZODone (DESYREL) 50 mg tablet; Take 1 tablet (50 mg total) by mouth daily at bedtime  3. Class 2 obesity due to excess calories without serious comorbidity with body mass index (BMI) of 38.0 to 38.9 in adult  Assessment & Plan:  Stable. No changes recommended at the moment.  4. Seasonal allergic rhinitis, unspecified trigger  Assessment & Plan:  Currently Flonase and antihistamine.  These are quite reasonable to continue.  Could add Singulair if it continues to be more of a problem.  Patient currently does have some significant issues with regard to eustachian tube dysfunction.  Orders:  -     montelukast (SINGULAIR) 10 mg tablet; Take 1 tablet (10 mg total) by mouth daily at bedtime  5. Dysfunction of both eustachian tubes  Comments:  Likely due to allergies.  Add Singulair.  Orders:  -     montelukast (SINGULAIR) 10 mg tablet; Take 1 tablet (10 mg total) by mouth daily at bedtime  6. Cerumen debris on tympanic membrane of both  ears  Comments:  Recommend hydrogen peroxide.      COVID 19 Instructions    Edgardo Jj was advised to limit contact with others to essential tasks such as getting food, medications, and medical care.    Proper handwashing reviewed, and Hand sanitzer when washing is not available.    If the patient develops symptoms of COVID 19, the patient should call the office as soon as possible.    It is strongly recommended that Flu Vaccinations be obtained.      Virtual Visits:  [employer]: This works on smart phones (any phone with Internet browsing capability).  You should get a text message when the provider is ready to see you.  Click on the link in the text message, and the call should start.  You will need to type in your name, and allow camera and microphone access.  This is HIPPA compliant, and secure.      If you have not already done so, get immunized to COVID 19.      We are committed to getting you vaccinated as soon as possible and will be closely following [facility] and [facility] guidelines as they are released and revised.  Please refer to our COVID-19 vaccine webpage for the most up to date information on the vaccine and our distribution efforts.    This site will also have the most up to date recommendations for COVID booster vaccine.    [email or web address]-[email or web address]    Call [phone] ([phone]), option 7    You can also visit https://www.vaccines.gov/ to find vaccines in your area.    OUR LOCATION:    [facility]  [address] [place] [address], [address]  [place], [address] [phone]  Fax: [phone]    Lab services, Rheumatology, and OB/GYN are at this location as well.

## 2024-06-17 NOTE — ASSESSMENT & PLAN NOTE
Currently Flonase and antihistamine.  These are quite reasonable to continue.  Could add Singulair if it continues to be more of a problem.  Patient currently does have some significant issues with regard to eustachian tube dysfunction.

## 2024-06-17 NOTE — PROGRESS NOTES
Ambulatory Visit  Name: Edgardo Jj      : 2000      MRN: [id number]  Encounter Provider: Rohith Zapata MD  Encounter Date: 2024   Encounter department: [facility]    Assessment & Plan   1. Moderate episode of recurrent major depressive disorder (HCC)  Assessment & Plan:  Did see Psych on Friday for the consult.  Psychiatry felt that the Zoloft and Wellbutrin were reasonable, but would add trazodone for sleep.  Could increase up to 200 of the trazodone, starting at 50 mg and increasing weekly by 50.    Laboratory studies are pending at this time.    Discussed with patient about increasing the Wellbutrin, which certainly could be done as well, but with the addition of trazodone we elected to stay with the current dosage of Zoloft and Wellbutrin.  Orders:  -     traZODone (DESYREL) 50 mg tablet; Take 1 tablet (50 mg total) by mouth daily at bedtime  2. Insomnia due to other mental disorder  Assessment & Plan:  Per recent psychiatry consult, they recommended starting trazodone, and can increase to 200 mg daily, titrating up by 50 mg weekly, if the patient is not having effect.  If that is not effective, they mentioned the possibility of using gabapentin.  Orders:  -     traZODone (DESYREL) 50 mg tablet; Take 1 tablet (50 mg total) by mouth daily at bedtime  3. Class 2 obesity due to excess calories without serious comorbidity with body mass index (BMI) of 38.0 to 38.9 in adult  Assessment & Plan:  Stable. No changes recommended at the moment.  4. Seasonal allergic rhinitis, unspecified trigger  Assessment & Plan:  Currently Flonase and antihistamine.  These are quite reasonable to continue.  Could add Singulair if it continues to be more of a problem.  Patient currently does have some significant issues with regard to eustachian tube dysfunction.  Orders:  -     montelukast (SINGULAIR) 10 mg tablet; Take 1 tablet (10 mg total) by mouth daily at bedtime  5.  Dysfunction of both eustachian tubes  Comments:  Likely due to allergies.  Add Singulair.  Orders:  -     montelukast (SINGULAIR) 10 mg tablet; Take 1 tablet (10 mg total) by mouth daily at bedtime  6. Cerumen debris on tympanic membrane of both ears  Comments:  Recommend hydrogen peroxide.    Depression Screening and Follow-up Plan: Patient's depression screening was positive with a PHQ-9 score of 8. Patient assessed for underlying major depression. Brief counseling provided and recommend additional follow-up/re-evaluation next office visit.       Chief Complaint   Patient presents with   • Follow-up     Pt states he has had a lot of stressors this past week.  mgb       History of Present Illness     Patient is here to follow-up on multiple issues.    Patient started on Wellbutrin approximately 2 weeks ago, but now has not really noticed any change with it.    Has had increased stress lately as well.          Review of Systems   Constitutional: Negative.    HENT: Negative.     Respiratory: Negative.     Cardiovascular: Negative.    Psychiatric/Behavioral:  Positive for decreased concentration and dysphoric mood. Negative for self-injury and suicidal ideas.        Objective     /74 (BP Location: Right arm, Patient Position: Sitting, Cuff Size: Large)   Pulse 75   Temp (!) 96.5 °F (35.8 °C) (Temporal)   Wt 125 kg (276 lb 4 oz)   SpO2 98%   BMI 38.53 kg/m²     Physical Exam  Vitals and nursing note reviewed.   Constitutional:       Appearance: Normal appearance.   HENT:      Ears:      Comments: Minimal amount of cerumen noted on both ear canals, but clearly normal TMs and visualized TMs bilaterally.  Neurological:      Mental Status: He is alert.   Psychiatric:         Attention and Perception: He is inattentive.         Mood and Affect: Affect is flat.         Speech: Speech normal.         Behavior: Behavior normal.         Thought Content: Thought content normal.         Cognition and Memory: Cognition  normal.         Judgment: Judgment normal.       Administrative Statements       Depression Screening Follow-up Plan: Patient's depression screening was positive with a PHQ-2 score of . Their PHQ-9 score was 8. Patient assessed for underlying major depression. They have no active suicidal ideations. Brief counseling provided and recommend additional follow-up/re-evaluation next office visit.

## 2024-06-26 DIAGNOSIS — F33.2 SEVERE EPISODE OF RECURRENT MAJOR DEPRESSIVE DISORDER, WITHOUT PSYCHOTIC FEATURES (HCC): ICD-10-CM

## 2024-06-26 RX ORDER — BUPROPION HYDROCHLORIDE 150 MG/1
150 TABLET ORAL EVERY MORNING
Qty: 90 TABLET | Refills: 1 | Status: SHIPPED | OUTPATIENT
Start: 2024-06-26

## 2024-07-09 DIAGNOSIS — F33.1 MODERATE EPISODE OF RECURRENT MAJOR DEPRESSIVE DISORDER (HCC): ICD-10-CM

## 2024-07-09 DIAGNOSIS — J30.2 SEASONAL ALLERGIC RHINITIS, UNSPECIFIED TRIGGER: ICD-10-CM

## 2024-07-09 DIAGNOSIS — F51.05 INSOMNIA DUE TO OTHER MENTAL DISORDER: ICD-10-CM

## 2024-07-09 DIAGNOSIS — F99 INSOMNIA DUE TO OTHER MENTAL DISORDER: ICD-10-CM

## 2024-07-09 DIAGNOSIS — H69.93 DYSFUNCTION OF BOTH EUSTACHIAN TUBES: ICD-10-CM

## 2024-07-10 DIAGNOSIS — Z00.6 ENCOUNTER FOR EXAMINATION FOR NORMAL COMPARISON OR CONTROL IN CLINICAL RESEARCH PROGRAM: ICD-10-CM

## 2024-07-10 RX ORDER — MONTELUKAST SODIUM 10 MG/1
10 TABLET ORAL
Qty: 100 TABLET | Refills: 1 | Status: SHIPPED | OUTPATIENT
Start: 2024-07-10

## 2024-07-10 RX ORDER — TRAZODONE HYDROCHLORIDE 50 MG/1
50 TABLET ORAL
Qty: 100 TABLET | Refills: 1 | Status: SHIPPED | OUTPATIENT
Start: 2024-07-10

## 2024-07-16 ENCOUNTER — RA CDI HCC (OUTPATIENT)
Dept: OTHER | Facility: HOSPITAL | Age: 24
End: 2024-07-16

## 2024-07-16 NOTE — PROGRESS NOTES
HCC coding opportunities       Chart reviewed, no opportunity found: CHART REVIEWED, NO OPPORTUNITY FOUND        Patients Insurance        Commercial Insurance: [employer]

## 2024-07-24 ENCOUNTER — OFFICE VISIT (OUTPATIENT)
Dept: FAMILY MEDICINE CLINIC | Facility: CLINIC | Age: 24
End: 2024-07-24
Payer: COMMERCIAL

## 2024-07-24 VITALS
OXYGEN SATURATION: 98 % | TEMPERATURE: 96.8 F | SYSTOLIC BLOOD PRESSURE: 110 MMHG | DIASTOLIC BLOOD PRESSURE: 60 MMHG | BODY MASS INDEX: 38.39 KG/M2 | HEIGHT: 71 IN | HEART RATE: 66 BPM | WEIGHT: 274.2 LBS

## 2024-07-24 DIAGNOSIS — F51.05 INSOMNIA DUE TO OTHER MENTAL DISORDER: ICD-10-CM

## 2024-07-24 DIAGNOSIS — F32.1 CURRENT MODERATE EPISODE OF MAJOR DEPRESSIVE DISORDER, UNSPECIFIED WHETHER RECURRENT (HCC): Primary | ICD-10-CM

## 2024-07-24 DIAGNOSIS — F99 INSOMNIA DUE TO OTHER MENTAL DISORDER: ICD-10-CM

## 2024-07-24 PROCEDURE — 99214 OFFICE O/P EST MOD 30 MIN: CPT | Performed by: FAMILY MEDICINE

## 2024-07-24 NOTE — ASSESSMENT & PLAN NOTE
Improved after adding trazodone.  Currently using 50 mg at bedtime.  Again, reviewed that it can be increased up to 200 mg if it were needed, but with him improving in some of his symptoms, would continue with only 50.  Continue on sertraline, Wellbutrin.  Agree with counseling, and can also check his iron level with next blood work which was ordered previously.

## 2024-07-24 NOTE — PROGRESS NOTES
Ambulatory Visit  Name: Edgardo Jj      : 2000      MRN: [id number]  Encounter Provider: Rohith Zapata MD  Encounter Date: 2024   Encounter department: [facility]    Assessment & Plan   1. Current moderate episode of major depressive disorder, unspecified whether recurrent (HCC)  Assessment & Plan:  Improved after adding trazodone.  Currently using 50 mg at bedtime.  Again, reviewed that it can be increased up to 200 mg if it were needed, but with him improving in some of his symptoms, would continue with only 50.  Continue on sertraline, Wellbutrin.  Agree with counseling, and can also check his iron level with next blood work which was ordered previously.    Orders:  -     TIBC Panel (incl. Iron, TIBC, % Iron Saturation); Future  2. Insomnia due to other mental disorder  Assessment & Plan:  Patient does have some issues with insomnia.  He is able to get to sleep, but has increased daytime sleepiness at times, and wake up feeling unrefreshed.  [place] score was 16, suggesting that he does have more issues, which may be related to sleep apnea.  Consider follow-up with sleep specialist to reevaluate.    Orders:  -     TIBC Panel (incl. Iron, TIBC, % Iron Saturation); Future  -     Ambulatory Referral to Sleep Medicine; Future       Chief Complaint   Patient presents with   • Follow-up       History of Present Illness     Patient is here for multiple issues.    Therapist requested iron check.    Insomnia: Has been better with getting to sleep after trazodone, but still some times wakes feeling unrefreshed.  Aware of snoring.  No witnessed apnea, but again would not be aware of that.  Does have some increased sleepiness during the day.            Review of Systems   Constitutional: Negative.    HENT: Negative.     Eyes: Negative.    Respiratory: Negative.     Cardiovascular: Negative.    Gastrointestinal: Negative.    Endocrine: Negative.    Genitourinary:  "Negative.    Musculoskeletal: Negative.    Skin: Negative.    Allergic/Immunologic: Negative.    Neurological: Negative.    Hematological: Negative.    Psychiatric/Behavioral:  Positive for sleep disturbance.        Objective     /60 (BP Location: Right arm, Patient Position: Sitting, Cuff Size: Standard)   Pulse 66   Temp (!) 96.8 °F (36 °C) (Tympanic)   Ht 5' 11\" (1.803 m)   Wt 124 kg (274 lb 3.2 oz)   SpO2 98%   BMI 38.24 kg/m²     Physical Exam  Vitals and nursing note reviewed.   Constitutional:       Appearance: Normal appearance. He is well-developed.   HENT:      Head: Normocephalic and atraumatic.   Cardiovascular:      Rate and Rhythm: Normal rate and regular rhythm.      Pulses:           Carotid pulses are 2+ on the right side and 2+ on the left side.     Heart sounds: Normal heart sounds. No murmur heard.     No friction rub. No gallop.   Pulmonary:      Effort: Pulmonary effort is normal. No respiratory distress.      Breath sounds: Normal breath sounds. No wheezing or rales.   Musculoskeletal:      Cervical back: Normal range of motion and neck supple.   Neurological:      Mental Status: He is alert.       Administrative Statements     "

## 2024-07-24 NOTE — ASSESSMENT & PLAN NOTE
Patient does have some issues with insomnia.  He is able to get to sleep, but has increased daytime sleepiness at times, and wake up feeling unrefreshed.  [place] score was 16, suggesting that he does have more issues, which may be related to sleep apnea.  Consider follow-up with sleep specialist to reevaluate.

## 2024-07-24 NOTE — PATIENT INSTRUCTIONS
1. Current moderate episode of major depressive disorder, unspecified whether recurrent (HCC)  Assessment & Plan:  Improved after adding trazodone.  Currently using 50 mg at bedtime.  Again, reviewed that it can be increased up to 200 mg if it were needed, but with him improving in some of his symptoms, would continue with only 50.  Continue on sertraline, Wellbutrin.  Agree with counseling, and can also check his iron level with next blood work which was ordered previously.    Orders:  -     TIBC Panel (incl. Iron, TIBC, % Iron Saturation); Future  2. Insomnia due to other mental disorder  Assessment & Plan:  Patient does have some issues with insomnia.  He is able to get to sleep, but has increased daytime sleepiness at times, and wake up feeling unrefreshed.  [place] score was 16, suggesting that he does have more issues, which may be related to sleep apnea.  Consider follow-up with sleep specialist to reevaluate.    Orders:  -     TIBC Panel (incl. Iron, TIBC, % Iron Saturation); Future  -     Ambulatory Referral to Sleep Medicine; Future      COVID 19 Instructions    Edgardo Jj was advised to limit contact with others to essential tasks such as getting food, medications, and medical care.    Proper handwashing reviewed, and Hand sanitzer when washing is not available.    If the patient develops symptoms of COVID 19, the patient should call the office as soon as possible.    It is strongly recommended that Flu Vaccinations be obtained.      Virtual Visits:  [employer]: This works on smart phones (any phone with Internet browsing capability).  You should get a text message when the provider is ready to see you.  Click on the link in the text message, and the call should start.  You will need to type in your name, and allow camera and microphone access.  This is HIPPA compliant, and secure.      If you have not already done so, get immunized to COVID 19.      We are committed to getting you vaccinated as  soon as possible and will be closely following CDC and [facility] guidelines as they are released and revised.  Please refer to our COVID-19 vaccine webpage for the most up to date information on the vaccine and our distribution efforts.    This site will also have the most up to date recommendations for COVID booster vaccine.    [email or web address]-vaccine    Call [phone] ([phone]), option 7    You can also visit https://www.vaccines.gov/ to find vaccines in your area.    OUR LOCATION:    [facility]  [address] [place] [address], [address]  [place], [place], [address] [phone]  Fax: [phone]    Lab services, Rheumatology, and OB/GYN are at this location as well.

## 2024-08-19 ENCOUNTER — APPOINTMENT (OUTPATIENT)
Dept: LAB | Facility: MEDICAL CENTER | Age: 24
End: 2024-08-19
Payer: COMMERCIAL

## 2024-08-19 DIAGNOSIS — F32.1 CURRENT MODERATE EPISODE OF MAJOR DEPRESSIVE DISORDER, UNSPECIFIED WHETHER RECURRENT (HCC): ICD-10-CM

## 2024-08-19 DIAGNOSIS — F51.05 INSOMNIA DUE TO OTHER MENTAL DISORDER: ICD-10-CM

## 2024-08-19 DIAGNOSIS — Z00.6 ENCOUNTER FOR EXAMINATION FOR NORMAL COMPARISON OR CONTROL IN CLINICAL RESEARCH PROGRAM: ICD-10-CM

## 2024-08-19 DIAGNOSIS — F99 INSOMNIA DUE TO OTHER MENTAL DISORDER: ICD-10-CM

## 2024-08-19 LAB
IRON SATN MFR SERPL: 20 % (ref 15–50)
IRON SERPL-MCNC: 68 UG/DL (ref 50–212)
TIBC SERPL-MCNC: 337 UG/DL (ref 250–450)
UIBC SERPL-MCNC: 269 UG/DL (ref 155–355)

## 2024-08-19 PROCEDURE — 83540 ASSAY OF IRON: CPT

## 2024-08-19 PROCEDURE — 36415 COLL VENOUS BLD VENIPUNCTURE: CPT

## 2024-08-19 PROCEDURE — 83550 IRON BINDING TEST: CPT

## 2024-08-20 ENCOUNTER — TELEPHONE (OUTPATIENT)
Dept: FAMILY MEDICINE CLINIC | Facility: CLINIC | Age: 24
End: 2024-08-20

## 2024-08-20 NOTE — TELEPHONE ENCOUNTER
----- Message from Rohith Zapata MD sent at 8/20/2024  3:16 PM EDT -----  Tests were normal. Please follow up at next office visit as scheduled.

## 2024-08-26 DIAGNOSIS — F33.2 SEVERE EPISODE OF RECURRENT MAJOR DEPRESSIVE DISORDER, WITHOUT PSYCHOTIC FEATURES (HCC): ICD-10-CM

## 2024-08-27 RX ORDER — SERTRALINE HYDROCHLORIDE 100 MG/1
200 TABLET, FILM COATED ORAL DAILY
Qty: 180 TABLET | Refills: 1 | Status: SHIPPED | OUTPATIENT
Start: 2024-08-27

## 2024-09-07 LAB
APOB+LDLR+PCSK9 GENE MUT ANL BLD/T: NOT DETECTED
BRCA1+BRCA2 DEL+DUP + FULL MUT ANL BLD/T: NOT DETECTED
MLH1+MSH2+MSH6+PMS2 GN DEL+DUP+FUL M: NOT DETECTED

## 2024-09-11 ENCOUNTER — OFFICE VISIT (OUTPATIENT)
Dept: FAMILY MEDICINE CLINIC | Facility: CLINIC | Age: 24
End: 2024-09-11
Payer: COMMERCIAL

## 2024-09-11 VITALS
BODY MASS INDEX: 37.94 KG/M2 | HEIGHT: 71 IN | WEIGHT: 271 LBS | RESPIRATION RATE: 20 BRPM | OXYGEN SATURATION: 97 % | SYSTOLIC BLOOD PRESSURE: 126 MMHG | HEART RATE: 70 BPM | DIASTOLIC BLOOD PRESSURE: 70 MMHG

## 2024-09-11 DIAGNOSIS — F51.05 INSOMNIA DUE TO OTHER MENTAL DISORDER: ICD-10-CM

## 2024-09-11 DIAGNOSIS — J30.2 SEASONAL ALLERGIC RHINITIS, UNSPECIFIED TRIGGER: ICD-10-CM

## 2024-09-11 DIAGNOSIS — H69.93 DYSFUNCTION OF BOTH EUSTACHIAN TUBES: ICD-10-CM

## 2024-09-11 DIAGNOSIS — F99 INSOMNIA DUE TO OTHER MENTAL DISORDER: ICD-10-CM

## 2024-09-11 DIAGNOSIS — F32.1 CURRENT MODERATE EPISODE OF MAJOR DEPRESSIVE DISORDER, UNSPECIFIED WHETHER RECURRENT (HCC): Primary | ICD-10-CM

## 2024-09-11 DIAGNOSIS — F33.2 SEVERE EPISODE OF RECURRENT MAJOR DEPRESSIVE DISORDER, WITHOUT PSYCHOTIC FEATURES (HCC): ICD-10-CM

## 2024-09-11 PROCEDURE — 99214 OFFICE O/P EST MOD 30 MIN: CPT | Performed by: FAMILY MEDICINE

## 2024-09-11 RX ORDER — BUPROPION HYDROCHLORIDE 150 MG/1
150 TABLET ORAL EVERY MORNING
Qty: 90 TABLET | Refills: 1 | Status: SHIPPED | OUTPATIENT
Start: 2024-09-11

## 2024-09-11 RX ORDER — MONTELUKAST SODIUM 10 MG/1
10 TABLET ORAL
Qty: 100 TABLET | Refills: 1 | Status: SHIPPED | OUTPATIENT
Start: 2024-09-11

## 2024-09-11 NOTE — ASSESSMENT & PLAN NOTE
Doing well with singulair.  Continue Claritin.    Orders:    montelukast (SINGULAIR) 10 mg tablet; Take 1 tablet (10 mg total) by mouth daily at bedtime

## 2024-09-11 NOTE — PATIENT INSTRUCTIONS
1. Current moderate episode of major depressive disorder, unspecified whether recurrent (HCC)  Assessment & Plan:  Trazodone, zoloft, Wellbutrin seeming to help.  Continue with counseling.           2. Insomnia due to other mental disorder  Assessment & Plan:  Better with sleeping, but still problems with feeling tired in AM.  Continue to persue sleep medicine.             3. Seasonal allergic rhinitis, unspecified trigger  Assessment & Plan:  Doing well with singulair.  Continue Claritin.         Orders:  -     montelukast (SINGULAIR) 10 mg tablet; Take 1 tablet (10 mg total) by mouth daily at bedtime  4. Severe episode of recurrent major depressive disorder, without psychotic features (HCC)  Assessment & Plan:  Trazodone, zoloft, Wellbutrin seeming to help.  Continue with counseling.           Orders:  -     buPROPion (WELLBUTRIN XL) 150 mg 24 hr tablet; Take 1 tablet (150 mg total) by mouth every morning  5. Dysfunction of both eustachian tubes  Comments:  Likely due to allergies.  Add Singulair.  Orders:  -     montelukast (SINGULAIR) 10 mg tablet; Take 1 tablet (10 mg total) by mouth daily at bedtime      COVID 19 Instructions    Edgardo Jj was advised to limit contact with others to essential tasks such as getting food, medications, and medical care.    Proper handwashing reviewed, and Hand sanitzer when washing is not available.    If the patient develops symptoms of COVID 19, the patient should call the office as soon as possible.    It is strongly recommended that Flu Vaccinations be obtained.      Virtual Visits:  [employer]: This works on smart phones (any phone with Internet browsing capability).  You should get a text message when the provider is ready to see you.  Click on the link in the text message, and the call should start.  You will need to type in your name, and allow camera and microphone access.  This is HIPPA compliant, and secure.      If you have not already done so, get immunized to  COVID 19.      We are committed to getting you vaccinated as soon as possible and will be closely following [facility] and [facility] guidelines as they are released and revised.  Please refer to our COVID-19 vaccine webpage for the most up to date information on the vaccine and our distribution efforts.    This site will also have the most up to date recommendations for COVID booster vaccine.    [email or web address]/protect-[email or web address]/covid-[email or web address]-vaccine    Call [phone] ([phone]), option 7    You can also visit https://www.vaccines.gov/ to find vaccines in your area.    OUR LOCATION:    [facility]  [address] [place] [address], [address]  [place], [address] [phone]  Fax: [phone]    Lab services, Rheumatology, and OB/GYN are at this location as well.

## 2024-09-11 NOTE — ASSESSMENT & PLAN NOTE
Better with sleeping, but still problems with feeling tired in AM.  Continue to persue sleep medicine.

## 2024-09-11 NOTE — PROGRESS NOTES
Ambulatory Visit  Name: Edgardo Jj      : 2000      MRN: [id number]  Encounter Provider: Rohith Zapata MD  Encounter Date: 2024   Encounter department: [facility]    Assessment & Plan  Current moderate episode of major depressive disorder, unspecified whether recurrent (HCC)  Trazodone, zoloft, Wellbutrin seeming to help.  Continue with counseling.           Insomnia due to other mental disorder  Better with sleeping, but still problems with feeling tired in AM.  Continue to persue sleep medicine.             Seasonal allergic rhinitis, unspecified trigger  Doing well with singulair.  Continue Claritin.    Orders:    montelukast (SINGULAIR) 10 mg tablet; Take 1 tablet (10 mg total) by mouth daily at bedtime    Severe episode of recurrent major depressive disorder, without psychotic features (HCC)  Trazodone, zoloft, Wellbutrin seeming to help.  Continue with counseling.      Orders:    buPROPion (WELLBUTRIN XL) 150 mg 24 hr tablet; Take 1 tablet (150 mg total) by mouth every morning    Dysfunction of both eustachian tubes    Orders:    montelukast (SINGULAIR) 10 mg tablet; Take 1 tablet (10 mg total) by mouth daily at bedtime       History of Present Illness     Patient still having issues with regard to anxiety and depression, along with insomnia.  He finds it in the morning it gets very difficult for him to get going.  No problems falling asleep anymore, but definitely difficulty getting up.  For depression, currently on Wellbutrin 150, Zoloft 100 mg 2 tablets daily, and trazodone 50 mg.  Does continue to see counseling.    Seasonal allergies: Currently using Singulair at at bedtime.  Does seem to help out quite a bit.  Continuing with that.              Review of Systems   Constitutional: Negative.    HENT: Negative.     Respiratory: Negative.     Cardiovascular: Negative.    Gastrointestinal: Negative.            Objective     /70 (BP Location: Right  "arm, Patient Position: Sitting, Cuff Size: Large)   Pulse 70   Resp 20   Ht 5' 11\" (1.803 m)   Wt 123 kg (271 lb)   SpO2 97%   BMI 37.80 kg/m²     Physical Exam  Vitals and nursing note reviewed.   Constitutional:       Appearance: Normal appearance. He is well-developed.   HENT:      Head: Normocephalic and atraumatic.   Cardiovascular:      Rate and Rhythm: Normal rate and regular rhythm.      Pulses:           Carotid pulses are 2+ on the right side and 2+ on the left side.     Heart sounds: Normal heart sounds. No murmur heard.     No friction rub. No gallop.   Pulmonary:      Effort: Pulmonary effort is normal. No respiratory distress.      Breath sounds: Normal breath sounds. No wheezing or rales.   Musculoskeletal:      Cervical back: Normal range of motion and neck supple.   Neurological:      Mental Status: He is alert.         "

## 2024-09-11 NOTE — ASSESSMENT & PLAN NOTE
Trazodone, zoloft, Wellbutrin seeming to help.  Continue with counseling.      Orders:    buPROPion (WELLBUTRIN XL) 150 mg 24 hr tablet; Take 1 tablet (150 mg total) by mouth every morning

## 2024-09-20 DIAGNOSIS — F32.1 CURRENT MODERATE EPISODE OF MAJOR DEPRESSIVE DISORDER, UNSPECIFIED WHETHER RECURRENT (HCC): Primary | ICD-10-CM

## 2024-09-23 ENCOUNTER — PATIENT OUTREACH (OUTPATIENT)
Dept: CASE MANAGEMENT | Facility: OTHER | Age: 24
End: 2024-09-23

## 2024-09-23 NOTE — PROGRESS NOTES
DAVID BORJAS received referral to contact patient to offer support regarding evaluation for Autism diagnosis.  Call to patient regarding same and message left.  Will await return call and remain available to provide support.

## 2024-09-24 ENCOUNTER — PATIENT OUTREACH (OUTPATIENT)
Dept: CASE MANAGEMENT | Facility: OTHER | Age: 24
End: 2024-09-24

## 2024-09-24 NOTE — PROGRESS NOTES
DAVID BORJAS received return call and message from patient and call placed to patient today.    Patient confirms that he is interested in being evaluated for Autism. He reports that he is currently involved with bi-weekly OP MH therapy and his therapist suggested him pursuing this evaluation. Informed patient that Psychiatrist, Psychologist or Neuropsychologist is able to make this diagnosis and Website Link for Insurance contracted Providers has been emailed to patient per his request.     Supportive counseling provided to patient who denies further needs at this time including SDOH concerns.  Patient is employed and resides with his family. He has access to transportation with his own car and is able to drive. Will close referral at this time and remain available to provide support.

## 2024-09-27 ENCOUNTER — TELEPHONE (OUTPATIENT)
Age: 24
End: 2024-09-27

## 2024-10-24 ENCOUNTER — RA CDI HCC (OUTPATIENT)
Dept: OTHER | Facility: HOSPITAL | Age: 24
End: 2024-10-24

## 2024-10-24 NOTE — PROGRESS NOTES
HCC coding opportunities       Chart reviewed, no opportunity found: CHART REVIEWED, NO OPPORTUNITY FOUND        Patients Insurance        Commercial Insurance: [employer]

## 2024-10-31 ENCOUNTER — OFFICE VISIT (OUTPATIENT)
Dept: FAMILY MEDICINE CLINIC | Facility: CLINIC | Age: 24
End: 2024-10-31
Payer: COMMERCIAL

## 2024-10-31 VITALS
OXYGEN SATURATION: 97 % | SYSTOLIC BLOOD PRESSURE: 118 MMHG | RESPIRATION RATE: 20 BRPM | WEIGHT: 267 LBS | DIASTOLIC BLOOD PRESSURE: 68 MMHG | BODY MASS INDEX: 37.38 KG/M2 | HEIGHT: 71 IN | HEART RATE: 70 BPM

## 2024-10-31 DIAGNOSIS — J30.2 SEASONAL ALLERGIC RHINITIS, UNSPECIFIED TRIGGER: ICD-10-CM

## 2024-10-31 DIAGNOSIS — F32.1 CURRENT MODERATE EPISODE OF MAJOR DEPRESSIVE DISORDER, UNSPECIFIED WHETHER RECURRENT (HCC): Primary | ICD-10-CM

## 2024-10-31 PROCEDURE — 99213 OFFICE O/P EST LOW 20 MIN: CPT | Performed by: FAMILY MEDICINE

## 2024-10-31 NOTE — PROGRESS NOTES
Ambulatory Visit  Name: Edgardo Jj      : 2000      MRN: [id number]  Encounter Provider: Rohith Zapata MD  Encounter Date: 10/31/2024   Encounter department: [facility]    Assessment & Plan  Current moderate episode of major depressive disorder, unspecified whether recurrent (HCC)  Patient seems to be doing quite well.  Follow-up in about 6 months.  Continue with current medications without change.  When the refills run low, he can certainly contact the office and we can fill up to the 6-month mane.  This would mean 90 days with a refill for each of the medications.         Seasonal allergic rhinitis, unspecified trigger  Continue Singulair.  Follow in 1 year.  Add Flonase or loratadine as needed for increased allergy symptoms.              1. Current moderate episode of major depressive disorder, unspecified whether recurrent (HCC)  Assessment & Plan:  Patient seems to be doing quite well.  Follow-up in about 6 months.  Continue with current medications without change.  When the refills run low, he can certainly contact the office and we can fill up to the 6-month mane.  This would mean 90 days with a refill for each of the medications.  2. Seasonal allergic rhinitis, unspecified trigger  Assessment & Plan:  Continue Singulair.  Follow in 1 year.  Add Flonase or loratadine as needed for increased allergy symptoms.      Chief Complaint   Patient presents with    Follow-up    Medication Management         History of Present Illness     Patient is here to follow-up on multiple issues.    With regard to his current diagnoses, he feels that the current combination seems to be working quite well for him.  No specific issues or problems.  Continues with Wellbutrin 150 mg daily, sertraline 100 mg 2 pills daily, trazodone 50 mg at bedtime.    Seasonal allergies: Currently using Singulair, loratadine, Flonase.  For the most part currently only needing to use the Singulair at  "bedtime.          Review of Systems   Constitutional: Negative.    HENT: Negative.     Eyes: Negative.    Respiratory: Negative.     Cardiovascular: Negative.    Gastrointestinal: Negative.    Endocrine: Negative.    Genitourinary: Negative.    Musculoskeletal: Negative.    Skin: Negative.    Allergic/Immunologic: Negative.    Neurological: Negative.    Hematological: Negative.    Psychiatric/Behavioral: Negative.             Objective     /68 (BP Location: Left arm, Patient Position: Sitting, Cuff Size: Large)   Pulse 70   Resp 20   Ht 5' 11\" (1.803 m)   Wt 121 kg (267 lb)   SpO2 97%   BMI 37.24 kg/m²     Physical Exam  Vitals and nursing note reviewed.   Constitutional:       Appearance: Normal appearance. He is well-developed.   HENT:      Head: Normocephalic and atraumatic.   Cardiovascular:      Rate and Rhythm: Normal rate and regular rhythm.      Pulses:           Carotid pulses are 2+ on the right side and 2+ on the left side.     Heart sounds: Normal heart sounds. No murmur heard.     No friction rub. No gallop.   Pulmonary:      Effort: Pulmonary effort is normal. No respiratory distress.      Breath sounds: Normal breath sounds. No wheezing or rales.   Musculoskeletal:      Cervical back: Normal range of motion and neck supple.   Neurological:      Mental Status: He is alert.         "

## 2024-10-31 NOTE — ASSESSMENT & PLAN NOTE
Patient seems to be doing quite well.  Follow-up in about 6 months.  Continue with current medications without change.  When the refills run low, he can certainly contact the office and we can fill up to the 6-month mane.  This would mean 90 days with a refill for each of the medications.

## 2024-10-31 NOTE — PATIENT INSTRUCTIONS
1. Current moderate episode of major depressive disorder, unspecified whether recurrent (HCC)  Assessment & Plan:  Patient seems to be doing quite well.  Follow-up in about 6 months.  Continue with current medications without change.  When the refills run low, he can certainly contact the office and we can fill up to the 6-month mane.  This would mean 90 days with a refill for each of the medications.         2. Seasonal allergic rhinitis, unspecified trigger  Assessment & Plan:  Continue Singulair.  Follow in 1 year.  Add Flonase or loratadine as needed for increased allergy symptoms.             COVID 19 Instructions    Edgardo Jj was advised to limit contact with others to essential tasks such as getting food, medications, and medical care.    Proper handwashing reviewed, and Hand sanitzer when washing is not available.    If the patient develops symptoms of COVID 19, the patient should call the office as soon as possible.    It is strongly recommended that Flu Vaccinations be obtained.      Virtual Visits:  AmWell: This works on smart phones (any phone with Internet browsing capability).  You should get a text message when the provider is ready to see you.  Click on the link in the text message, and the call should start.  You will need to type in your name, and allow camera and microphone access.  This is HIPPA compliant, and secure.      If you have not already done so, get immunized to COVID 19.      We are committed to getting you vaccinated as soon as possible and will be closely following CDC and [facility] guidelines as they are released and revised.  Please refer to our COVID-19 vaccine webpage for the most up to date information on the vaccine and our distribution efforts.    This site will also have the most up to date recommendations for COVID booster vaccine.    https://www.slhn.org/covid-19/protect-yourself/covid-19-vaccine    Call 7-161-LACILPV ([phone]), option 7    You can also  visit https://www.vaccines.gov/ to find vaccines in your area.    OUR LOCATION:    [address], [address]  [place], [address] [phone]  Fax: [phone]    Lab services, Rheumatology, and OB/GYN are at this location as well.

## 2024-10-31 NOTE — ASSESSMENT & PLAN NOTE
Continue Singulair.  Follow in 1 year.  Add Flonase or loratadine as needed for increased allergy symptoms.

## 2024-11-25 DIAGNOSIS — F99 INSOMNIA DUE TO OTHER MENTAL DISORDER: ICD-10-CM

## 2024-11-25 DIAGNOSIS — F33.1 MODERATE EPISODE OF RECURRENT MAJOR DEPRESSIVE DISORDER (HCC): ICD-10-CM

## 2024-11-25 DIAGNOSIS — F51.05 INSOMNIA DUE TO OTHER MENTAL DISORDER: ICD-10-CM

## 2024-11-26 RX ORDER — TRAZODONE HYDROCHLORIDE 50 MG/1
50 TABLET, FILM COATED ORAL
Qty: 90 TABLET | Refills: 1 | Status: SHIPPED | OUTPATIENT
Start: 2024-11-26

## 2024-11-28 DIAGNOSIS — F33.2 SEVERE EPISODE OF RECURRENT MAJOR DEPRESSIVE DISORDER, WITHOUT PSYCHOTIC FEATURES (HCC): ICD-10-CM

## 2024-11-28 RX ORDER — SERTRALINE HYDROCHLORIDE 100 MG/1
200 TABLET, FILM COATED ORAL DAILY
Qty: 180 TABLET | Refills: 1 | Status: SHIPPED | OUTPATIENT
Start: 2024-11-28

## 2025-02-24 ENCOUNTER — TELEPHONE (OUTPATIENT)
Age: 25
End: 2025-02-24

## 2025-03-05 ENCOUNTER — RA CDI HCC (OUTPATIENT)
Dept: OTHER | Facility: HOSPITAL | Age: 25
End: 2025-03-05

## 2025-03-05 NOTE — PROGRESS NOTES
HCC coding opportunities       Chart reviewed, no opportunity found: CHART REVIEWED, NO OPPORTUNITY FOUND        Patients Insurance        Commercial Insurance: [employer]

## 2025-03-12 ENCOUNTER — TELEPHONE (OUTPATIENT)
Age: 25
End: 2025-03-12

## 2025-03-12 ENCOUNTER — OFFICE VISIT (OUTPATIENT)
Dept: FAMILY MEDICINE CLINIC | Facility: CLINIC | Age: 25
End: 2025-03-12
Payer: COMMERCIAL

## 2025-03-12 VITALS
WEIGHT: 262 LBS | DIASTOLIC BLOOD PRESSURE: 70 MMHG | SYSTOLIC BLOOD PRESSURE: 126 MMHG | BODY MASS INDEX: 36.68 KG/M2 | HEIGHT: 71 IN | HEART RATE: 68 BPM | RESPIRATION RATE: 18 BRPM | OXYGEN SATURATION: 98 %

## 2025-03-12 DIAGNOSIS — F99 INSOMNIA DUE TO OTHER MENTAL DISORDER: Primary | ICD-10-CM

## 2025-03-12 DIAGNOSIS — F51.05 INSOMNIA DUE TO OTHER MENTAL DISORDER: Primary | ICD-10-CM

## 2025-03-12 DIAGNOSIS — F33.2 SEVERE EPISODE OF RECURRENT MAJOR DEPRESSIVE DISORDER, WITHOUT PSYCHOTIC FEATURES (HCC): ICD-10-CM

## 2025-03-12 PROCEDURE — 99214 OFFICE O/P EST MOD 30 MIN: CPT | Performed by: FAMILY MEDICINE

## 2025-03-12 NOTE — PATIENT INSTRUCTIONS
1. Insomnia due to other mental disorder  Assessment & Plan:  Trazodone at 51 seems to help him get to sleep.  Would not necessarily increase that.       2. Severe episode of recurrent major depressive disorder, without psychotic features (HCC)  Assessment & Plan:  Psychiatry Resident Consult:    The patient has been identified by the provider as needing Psychiatry services.  After discussion with the patient, the provider and patient agree to a consult with a psychiatry resident.  The patient understands that this is a single visit, and that ongoing care will be with the PCP.  The patient will be contacted by psychiatry intake to schedule the consult.  The provider acknowledged that the consult is one time, not ongoing care.  The provider will then take care of the patient after the consult.    Patient is having an increased amount of problems recently with depression and anxiety.  More problems with feeling down.  Some sense of impending doom with that as well.  Has been on medication for a bit now with Wellbutrin, Zoloft, trazodone.  Unfortunately, despite maximal dose of Zoloft and reasonable dose of Wellbutrin and trazodone, he still is having problems.  Would agree with psych resident consult, as well as consider other medications.  Patient would prefer to see the psych resident first, which is quite reasonable.  Other options could include increasing Wellbutrin, increasing trazodone, Abilify.  Will follow-up in the future, put approximately 1 month for visit.  Would also recommend recheck labs, including CBC, CMP, TSH.    Orders:    Ambulatory referral to Psych Services; Future    CBC and differential; Future    Comprehensive metabolic panel; Future    TSH, 3rd generation; Future    Orders:  -     Ambulatory referral to Psych Services; Future  -     CBC and differential; Future  -     Comprehensive metabolic panel; Future; Expected date: 03/12/2025  -     TSH, 3rd generation; Future; Expected date:  03/12/2025      COVID 19 Instructions    Edgardo Beni Jj was advised to limit contact with others to essential tasks such as getting food, medications, and medical care.    Proper handwashing reviewed, and Hand sanitzer when washing is not available.    If the patient develops symptoms of COVID 19, the patient should call the office as soon as possible.    It is strongly recommended that Flu Vaccinations be obtained.      Virtual Visits:  ScanCafe: This works on smart phones (any phone with Internet browsing capability).  You should get a text message when the provider is ready to see you.  Click on the link in the text message, and the call should start.  You will need to type in your name, and allow camera and microphone access.  This is HIPPA compliant, and secure.      If you have not already done so, get immunized to COVID 19.      We are committed to getting you vaccinated as soon as possible and will be closely following CDC and [facility] guidelines as they are released and revised.  Please refer to our COVID-19 vaccine webpage for the most up to date information on the vaccine and our distribution efforts.    This site will also have the most up to date recommendations for COVID booster vaccine.    https://www.slhn.org/covid-19/protect-yourself/covid-19-vaccine    Call 1-511-UASQDWX ([phone]), option 7    You can also visit https://www.vaccines.gov/ to find vaccines in your area.    OUR LOCATION:    [facility]  [address] [place] [address], [address]  [place], [place], [address] [phone]  Fax: [phone]    Lab services, Rheumatology, and OB/GYN are at this location as well.

## 2025-03-12 NOTE — TELEPHONE ENCOUNTER
[facility] sent to OTC Coordinator    Liliya Woodruff  Good Afternoon,    Please see referral for OTC and review.    Thank you!

## 2025-03-12 NOTE — PROGRESS NOTES
Name: Edgardo Jj      : 2000      MRN: [id number]  Encounter Provider: Rohith Zapata MD  Encounter Date: 3/12/2025   Encounter department: [facility]  :  Assessment & Plan  Insomnia due to other mental disorder  Trazodone at 51 seems to help him get to sleep.  Would not necessarily increase that.       Severe episode of recurrent major depressive disorder, without psychotic features (HCC)  Psychiatry Resident Consult:    The patient has been identified by the provider as needing Psychiatry services.  After discussion with the patient, the provider and patient agree to a consult with a psychiatry resident.  The patient understands that this is a single visit, and that ongoing care will be with the PCP.  The patient will be contacted by psychiatry intake to schedule the consult.  The provider acknowledged that the consult is one time, not ongoing care.  The provider will then take care of the patient after the consult.    Patient is having an increased amount of problems recently with depression and anxiety.  More problems with feeling down.  Some sense of impending doom with that as well.  Has been on medication for a bit now with Wellbutrin, Zoloft, trazodone.  Unfortunately, despite maximal dose of Zoloft and reasonable dose of Wellbutrin and trazodone, he still is having problems.  Would agree with psych resident consult, as well as consider other medications.  Patient would prefer to see the psych resident first, which is quite reasonable.  Other options could include increasing Wellbutrin, increasing trazodone, Abilify.  Will follow-up in the future, put approximately 1 month for visit.  Would also recommend recheck labs, including CBC, CMP, TSH.    Orders:  •  Ambulatory referral to Psych Services; Future  •  CBC and differential; Future  •  Comprehensive metabolic panel; Future  •  TSH, 3rd generation; Future           History of Present Illness   Patient is  "here today with increased anxiety and stress.  Continues with similar symptoms previously, but now noticing increased problems.  Medications do not seem to be making a difference at this point.    In the past, he had been to the psychiatry resident program for evaluation.  Again, had been doing better at that point.    Has not had any lab work done lately for this.  Would like to see if there was anything else in particular he can do for that.    Insomnia: Still has some issues with this.  Using trazodone, seems to work out relatively well.          Review of Systems   Constitutional: Negative.    HENT: Negative.     Eyes: Negative.    Respiratory: Negative.     Cardiovascular: Negative.    Gastrointestinal: Negative.    Endocrine: Negative.    Genitourinary: Negative.    Musculoskeletal: Negative.    Skin: Negative.    Allergic/Immunologic: Negative.    Neurological: Negative.    Hematological: Negative.    Psychiatric/Behavioral:  Positive for decreased concentration. Negative for behavioral problems, sleep disturbance and suicidal ideas. The patient is nervous/anxious.        Objective   /70 (BP Location: Left arm, Patient Position: Sitting, Cuff Size: Large)   Pulse 68   Resp 18   Ht 5' 11\" (1.803 m)   Wt 119 kg (262 lb)   SpO2 98%   BMI 36.54 kg/m²      Physical Exam    "

## 2025-03-15 ENCOUNTER — APPOINTMENT (OUTPATIENT)
Dept: LAB | Facility: MEDICAL CENTER | Age: 25
End: 2025-03-15
Payer: COMMERCIAL

## 2025-03-15 DIAGNOSIS — F33.2 SEVERE EPISODE OF RECURRENT MAJOR DEPRESSIVE DISORDER, WITHOUT PSYCHOTIC FEATURES (HCC): ICD-10-CM

## 2025-03-15 DIAGNOSIS — F32.1 CURRENT MODERATE EPISODE OF MAJOR DEPRESSIVE DISORDER, UNSPECIFIED WHETHER RECURRENT (HCC): ICD-10-CM

## 2025-03-15 LAB
25(OH)D3 SERPL-MCNC: 12.4 NG/ML (ref 30–100)
ALBUMIN SERPL BCG-MCNC: 4.5 G/DL (ref 3.5–5)
ALP SERPL-CCNC: 68 U/L (ref 34–104)
ALT SERPL W P-5'-P-CCNC: 13 U/L (ref 7–52)
ANION GAP SERPL CALCULATED.3IONS-SCNC: 7 MMOL/L (ref 4–13)
AST SERPL W P-5'-P-CCNC: 12 U/L (ref 13–39)
BASOPHILS # BLD AUTO: 0.03 THOUSANDS/ÂΜL (ref 0–0.1)
BASOPHILS NFR BLD AUTO: 1 % (ref 0–1)
BILIRUB SERPL-MCNC: 0.38 MG/DL (ref 0.2–1)
BUN SERPL-MCNC: 15 MG/DL (ref 5–25)
CALCIUM SERPL-MCNC: 9.4 MG/DL (ref 8.4–10.2)
CHLORIDE SERPL-SCNC: 103 MMOL/L (ref 96–108)
CO2 SERPL-SCNC: 28 MMOL/L (ref 21–32)
CREAT SERPL-MCNC: 0.73 MG/DL (ref 0.6–1.3)
EOSINOPHIL # BLD AUTO: 0.13 THOUSAND/ÂΜL (ref 0–0.61)
EOSINOPHIL NFR BLD AUTO: 2 % (ref 0–6)
ERYTHROCYTE [DISTWIDTH] IN BLOOD BY AUTOMATED COUNT: 13.1 % (ref 11.6–15.1)
FOLATE SERPL-MCNC: 10.4 NG/ML
GFR SERPL CREATININE-BSD FRML MDRD: 130 ML/MIN/1.73SQ M
GLUCOSE P FAST SERPL-MCNC: 100 MG/DL (ref 65–99)
HCT VFR BLD AUTO: 42.7 % (ref 36.5–49.3)
HGB BLD-MCNC: 13.4 G/DL (ref 12–17)
IMM GRANULOCYTES # BLD AUTO: 0.02 THOUSAND/UL (ref 0–0.2)
IMM GRANULOCYTES NFR BLD AUTO: 0 % (ref 0–2)
LYMPHOCYTES # BLD AUTO: 2.14 THOUSANDS/ÂΜL (ref 0.6–4.47)
LYMPHOCYTES NFR BLD AUTO: 33 % (ref 14–44)
MCH RBC QN AUTO: 28 PG (ref 26.8–34.3)
MCHC RBC AUTO-ENTMCNC: 31.4 G/DL (ref 31.4–37.4)
MCV RBC AUTO: 89 FL (ref 82–98)
MONOCYTES # BLD AUTO: 0.44 THOUSAND/ÂΜL (ref 0.17–1.22)
MONOCYTES NFR BLD AUTO: 7 % (ref 4–12)
NEUTROPHILS # BLD AUTO: 3.72 THOUSANDS/ÂΜL (ref 1.85–7.62)
NEUTS SEG NFR BLD AUTO: 57 % (ref 43–75)
NRBC BLD AUTO-RTO: 0 /100 WBCS
PLATELET # BLD AUTO: 265 THOUSANDS/UL (ref 149–390)
PMV BLD AUTO: 11.8 FL (ref 8.9–12.7)
POTASSIUM SERPL-SCNC: 4.9 MMOL/L (ref 3.5–5.3)
PROT SERPL-MCNC: 7 G/DL (ref 6.4–8.4)
RBC # BLD AUTO: 4.78 MILLION/UL (ref 3.88–5.62)
SODIUM SERPL-SCNC: 138 MMOL/L (ref 135–147)
TSH SERPL DL<=0.05 MIU/L-ACNC: 1.12 UIU/ML (ref 0.45–4.5)
VIT B12 SERPL-MCNC: 161 PG/ML (ref 180–914)
WBC # BLD AUTO: 6.48 THOUSAND/UL (ref 4.31–10.16)

## 2025-03-15 PROCEDURE — 80053 COMPREHEN METABOLIC PANEL: CPT

## 2025-03-15 PROCEDURE — 36415 COLL VENOUS BLD VENIPUNCTURE: CPT

## 2025-03-15 PROCEDURE — 82306 VITAMIN D 25 HYDROXY: CPT

## 2025-03-15 PROCEDURE — 84443 ASSAY THYROID STIM HORMONE: CPT

## 2025-03-15 PROCEDURE — 82607 VITAMIN B-12: CPT

## 2025-03-15 PROCEDURE — 82746 ASSAY OF FOLIC ACID SERUM: CPT

## 2025-03-15 PROCEDURE — 85025 COMPLETE CBC W/AUTO DIFF WBC: CPT

## 2025-03-18 NOTE — TELEPHONE ENCOUNTER
"Behavioral Health Outpatient Intake Questions    Referred By   : PCP    Please advise interviewee that they need to answer all questions truthfully to allow for best care, and any misrepresentations of information may affect their ability to be seen at this clinic   => Was this discussed? Yes       Behavioral Health Outpatient Intake History -     Presenting Problem (in patient's own words):   Issues with Anxiety and Depression    Are there any communication barriers for this patient?     NO                                             Are you taking any psychiatric medications? Yes   Zoloft & Welbutrin - PCP    Has the Patient previously received outpatient Talk Therapy or Medication Management from [facility]  No       Has the Patient abused alcohol or other substances in the last 6 months ? No          Legal History-     Is this treatment court ordered? No       Has the Patient been convicted of a felony?      ACCEPTED as a patient Yes  If \"Yes\" Appointment Date: 4/14 12:30 Dr. Lamb    Referred Elsewhere? No    Name of Insurance Co:[employer]  Insurance ID# [id number]   Insurance Phone #  If ins is primary or secondary? Primary  If patient is a minor, parents information such as Name, D.O.B of guarantor.  "

## 2025-03-20 NOTE — TELEPHONE ENCOUNTER
Called and spoke to patient to reschedule appointment per Carmella. Patient would like to reschedule with Danette on 3/25 at 8am.

## 2025-03-20 NOTE — TELEPHONE ENCOUNTER
Meka Ortiz  This patient needs to be r/s this is a PCP  OTC consult and this goes to a PGY4 not a 3.    Danette 3/25 at 8am    Mare 3/31 at 8am    Carmella
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
## 2025-03-25 ENCOUNTER — CONSULT (OUTPATIENT)
Dept: PSYCHIATRY | Facility: CLINIC | Age: 25
End: 2025-03-25
Payer: COMMERCIAL

## 2025-03-25 ENCOUNTER — TELEPHONE (OUTPATIENT)
Age: 25
End: 2025-03-25

## 2025-03-25 VITALS — WEIGHT: 265 LBS | BODY MASS INDEX: 36.96 KG/M2

## 2025-03-25 DIAGNOSIS — F33.1 MODERATE EPISODE OF RECURRENT MAJOR DEPRESSIVE DISORDER (HCC): Primary | ICD-10-CM

## 2025-03-25 DIAGNOSIS — F43.9 TRAUMA AND STRESSOR-RELATED DISORDER: ICD-10-CM

## 2025-03-25 DIAGNOSIS — R68.89 SUSPECTED AUTISM DISORDER: ICD-10-CM

## 2025-03-25 DIAGNOSIS — F41.1 GENERALIZED ANXIETY DISORDER WITH PANIC ATTACKS: ICD-10-CM

## 2025-03-25 DIAGNOSIS — F41.0 GENERALIZED ANXIETY DISORDER WITH PANIC ATTACKS: ICD-10-CM

## 2025-03-25 DIAGNOSIS — E55.9 VITAMIN D DEFICIENCY: ICD-10-CM

## 2025-03-25 PROCEDURE — 90792 PSYCH DIAG EVAL W/MED SRVCS: CPT

## 2025-03-25 RX ORDER — ERGOCALCIFEROL 1.25 MG/1
50000 CAPSULE, LIQUID FILLED ORAL WEEKLY
Qty: 12 CAPSULE | Refills: 0 | Status: SHIPPED | OUTPATIENT
Start: 2025-03-25

## 2025-03-25 RX ORDER — SERTRALINE HYDROCHLORIDE 100 MG/1
100 TABLET, FILM COATED ORAL DAILY
Start: 2025-03-25 | End: 2025-04-08

## 2025-03-25 RX ORDER — BUPROPION HYDROCHLORIDE 150 MG/1
150 TABLET ORAL EVERY MORNING
Qty: 30 TABLET | Refills: 0 | Status: SHIPPED | OUTPATIENT
Start: 2025-03-25

## 2025-03-25 RX ORDER — ESCITALOPRAM OXALATE 10 MG/1
TABLET ORAL
Qty: 75 TABLET | Refills: 0 | Status: SHIPPED | OUTPATIENT
Start: 2025-03-25 | End: 2025-05-08

## 2025-03-25 NOTE — TELEPHONE ENCOUNTER
Writer spoke to patient in regards to scheduling for psychological testing. Patient was scheduled VV 4/8/2025 @ 12:00 pm with .     Writer had called patient insurance no authorization is required.       ASD

## 2025-03-25 NOTE — ASSESSMENT & PLAN NOTE
Decreased Zoloft to 100 mg daily for 14 days and instructed the patient to stop this medication after 14 days  Started Lexapro 10 mg daily for 14 days and instructed the patient to increase this medication to 20 mg daily after 14 days  At this time, Zoloft is being cross tapered for Lexapro to better address symptoms of depression and anxiety  PARQ completed including serotonin syndrome, SIADH, worsening depression, suicidality, induction of maxine, GI upset, headaches, activation, sexual side effects, sedation, potential drug interactions, and others.    Continue Wellbutrin 150 mg XL daily for symptoms of depression  If the patient continues to report prominent symptoms of depression after being on Lexapro 20 mg daily for a period of 1 month, recommend increasing Wellbutrin to 300 mg XL daily  PARQ was completed for bupropion (Wellbutrin) including nausea, insomnia, agitation/activation, weight loss, anxiety, palpitations, hypertension, decreased seizure threshold and risk with alcohol withdrawal or electrolyte disturbances.      If the patient continues to report prominent symptoms of depression on Lexapro 20 mg daily and Wellbutrin 300 mg XL daily for a period of 1 month, recommend discontinuing Wellbutrin (reducing the dose to 150 mg XL daily for 7 days and then discontinuing the medication) and then starting Abilify 2 mg daily after Wellbutrin has been discontinued  PARQ was completed for second generation antipsychotic medication including sedation, GI distress, dizziness, risk of metabolic syndrome, EPS (akathisia, TD, etc), rare NMS, orthostatic hypotension, cardiovascular risks such as QT prolongation, increased prolactin, and others.    Continue trazodone 50 mg at bedtime for insomnia in the setting of major depressive disorder  PARQ was completed for trazodone including sedation, dizziness, headache, GI distress, priapism, suicidal thoughts in patients under 25 years old, and serotonin syndrome. Educated  patient that taking this medication with food can increase these adverse effects.    Depression Follow-up Plan Completed: Yes

## 2025-03-25 NOTE — BH CRISIS PLAN
Client Name: Edgardo Jj       Client YOB: 2000    Faisal[employer]uan Safety Plan      Creation Date: 3/25/25 Update Date: 3/25/25   Created By: Naveed Samaniego MD Last Updated By: Naveed Samaniego MD      Step 1: Warning Signs:   Warning Signs   When I think about all the negative things in my life   When I feel like I'm better off dead   When I feel no motivation            Step 2: Internal Coping Strategies:   Internal Coping Strategies   Going to a quiet place   Box breathing   Playing video games   Going for walks            Step 3: People and social settings that provide distraction:   Name Contact Information   My friends Numbers are in the patient's phone   My family Numbers are in the patient's phone   Sarah Jack (Mother) [phone]    [employer]   [employer]   [place]           Step 4: People whom I can ask for help during a crisis:      Name Contact Information    Sarah Jack (Mother) [phone]      Step 5: Professionals or agencies I can contact during a crisis:      Clinican/Agency Name Phone Emergency Contact    "[employer] [phone]       [facility] Emergency Department Emergency Department Phone Emergency Department Address    [phone]        Crisis Phone Numbers:   Suicide Prevention Lifeline: Call or Text  674 Crisis Text Line: Text HOME to 612-784   Please note: Some [facility] do not have a separate number for Child/Adolescent specific crisis. If your county is not listed under Child/Adolescent, please call the adult number for your county      Adult Crisis Numbers: Child/Adolescent Crisis Numbers   [facility]: [phone] [facility]: [phone]   [facility]: [phone] [facility]: [phone]   [facility]: [phone] [place]: [phone]   [facility]: [phone] [facility]: [phone]   [facility]: [phone]   [facility]: [phone]   [facility]  [facility]: [phone]   [place]: [phone] (daytime) [phone] (after hours, weekends, holidays)      Step 6: Making the environment safer (plan for lethal means safety):   Patient did not identify any lethal methods: Yes     Optional: What is most important to me and worth living for?   I want to live for my family     Nael Safety Plan. Maribell Malone and Leonel Martinez. Used with permission of the authors.

## 2025-03-25 NOTE — PATIENT INSTRUCTIONS
Please present for your previously scheduled appointment approximately 15 minutes prior to appointment time. If you are running late or are unable to attend your appointment, please call our [place] office at [phone] or our [place] office at [phone] if applicable to notify the office of your absence.    If you are in psychological crisis including not limited to suicidal/homicidal thoughts or thoughts of self-injury, do not hesitate to call/contact your County Crisis hotline (see below) or attend to the nearest emergency department.  [facility] Crisis: [phone]  [facility] Crisis: [phone]  [facility] Crisis: [phone]  [facility] Crisis: [phone]  [facility] Crisis: [phone]  [facility] Crisis: [phone]  [facility] Crisis: [phone]  National Suicide Prevention Hotline: 1-708.637.7660

## 2025-03-28 ENCOUNTER — DOCUMENTATION (OUTPATIENT)
Dept: FAMILY MEDICINE CLINIC | Facility: CLINIC | Age: 25
End: 2025-03-28
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
## 2025-04-08 ENCOUNTER — TELEMEDICINE (OUTPATIENT)
Age: 25
End: 2025-04-08
Payer: COMMERCIAL

## 2025-04-08 DIAGNOSIS — F33.1 MODERATE EPISODE OF RECURRENT MAJOR DEPRESSIVE DISORDER (HCC): Primary | ICD-10-CM

## 2025-04-08 PROCEDURE — 90791 PSYCH DIAGNOSTIC EVALUATION: CPT | Performed by: COUNSELOR

## 2025-04-09 NOTE — PSYCH
Virtual AWV Consent    Reason for visit is clinical interview for diagnostic testing.    Provider located at [facility]  [facility]  [address] [place] [id number] [phone]    Recent Visits  No visits were found meeting these conditions.  Showing recent visits within past 7 days and meeting all other requirements  Future Appointments  No visits were found meeting these conditions.  Showing future appointments within next 150 days and meeting all other requirements       Administrative Statements   Encounter provider Mary Lou Troncoso PsyD    The Patient is located at Home and in the following state in which I hold an active license [place].    The patient was identified by name and date of birth. Edgardo Jj was informed that this is a telemedicine visit and that the visit is being conducted through the Epic Embedded platform. He agrees to proceed..  My office door was closed. No one else was in the room.  He acknowledged consent and understanding of privacy and security of the video platform. The patient has agreed to participate and understands they can discontinue the visit at any time.      PSYCHOLOGICAL EVALUATION    [address] [address]  Phone: [phone]   Fax: [phone]      History and Clinical Interview    Patient Name: Edgardo Jj  Age: 24 y.o.  MRN: [id number]   : 2000    Date of Evaluation: 2025      REFERRAL/PRESENTING INFORMATION:   Edgardo is a @AGE male who presents for psychological evaluation upon referral from  Naveed Samaniego , for assessment of for assessment for Autism Spectrum Disorder . Edgardo was unaccompanied when presented to today's appointment. The history, as reported below, incorporates information obtained through medical record review, patient report, and clinical observation as applicable.    HPI:   Edgardo is  "a 24 y.o. male with a history of Unspecified Depressive Disorder who presents for psychological testing intake. Primary complaints include: not doing well in social settings, changed to online school during highschool, has sound sensitivities and does not like gritty textures. He reports he struggles with developing and maintaining a routine, is very interested in fantasy, Dungeons and Dragons, and history.     Current Psychiatrist: Dr. Naveed Samaniego     Current Therapist: Therapist with outside practice    REVIEW OF SYMPTOMS:    Cognition:    Attention: problem focusing for long periods and difficulty following instructions  Processing Speed: taking longer to complete tasks  Learning and Memory: problems with short term memory  Executive Functioning: increased impulsivity  Non-Verbal/Visual Skills: difficulties distinguishing left/right  Speech/Language: Normal    Mood/Behavior/Other Psychiatric Issues:    Depressive Symptoms: anhedonia, sadness, hopelessness, helplessness, low energy, low motivation, decreased interest, poor concentration, ruminations, negative thoughts, mood swings, passive death wish  Mood Instability Symptoms: mood swings, difficulty sleeping  Anxiety Symptoms: daily anxiety symptoms, feeling nervous, worrying, worrying daily, worrying about everyday issues, poor concentration, feeling agitated, racing thoughts, anxiety in social situations  Psychotic Symptoms: unremarkable  Eating Disorder Symptoms: unremarkable  Behavioral Problems: unremarkable    ADLs/IADLs:    Independent/Normal ADLs/IADLs    Additional Symptoms:    Sensory/Motor: None  Physical: Edgardo reports he recently fainted before going to his doctor's appt due to being \"really nervous\".    Sleep: 7 to 10 hours of sleep per night  Energy: low energy level and daytime fatigue      CURRENT MEDICATIONS:      Current Outpatient Medications:     buPROPion (WELLBUTRIN XL) 150 mg 24 hr tablet, Take 1 tablet (150 mg total) by mouth " every morning, Disp: 30 tablet, Rfl: 0    ergocalciferol (VITAMIN D2) 50,000 units, Take 1 capsule (50,000 Units total) by mouth once a week, Disp: 12 capsule, Rfl: 0    escitalopram (LEXAPRO) 10 mg tablet, Take 1 tablet (10 mg total) by mouth daily for 14 days, THEN 2 tablets (20 mg total) daily., Disp: 75 tablet, Rfl: 0    fluticasone (FLONASE) 50 mcg/act nasal spray, 2 sprays into each nostril daily (Patient taking differently: 2 sprays into each nostril daily as needed for allergies), Disp: , Rfl:     loratadine (CLARITIN) 10 mg tablet, Take 10 mg by mouth daily (Patient taking differently: Take 10 mg by mouth daily as needed for allergies), Disp: , Rfl:     montelukast (SINGULAIR) 10 mg tablet, Take 1 tablet (10 mg total) by mouth daily at bedtime (Patient not taking: Reported on 3/25/2025), Disp: 100 tablet, Rfl: 1    naproxen sodium (ALEVE) 220 MG tablet, Take 2 tablets (440 mg total) by mouth 2 (two) times a day with meals (Patient taking differently: Take 440 mg by mouth 2 (two) times a day as needed for mild pain), Disp: , Rfl:     sertraline (ZOLOFT) 100 mg tablet, Take 1 tablet (100 mg total) by mouth daily for 14 days - Then stop this medication, Disp: , Rfl:     traZODone (DESYREL) 50 mg tablet, TAKE 1 TABLET BY MOUTH EVERYDAY AT BEDTIME, Disp: 90 tablet, Rfl: 1   Other medications (per patient report): None      HISTORY:    Personal History:    Psychiatric History:  Psychiatric Hospitalizations:   No history of past inpatient psychiatric admissions  Suicide Attempts:   None  History of self-harm:   None  Violence History:   no    Medical History:    Patient Active Problem List   Diagnosis    Nausea    Obesity    Allergic rhinitis    Fatty liver    Liver nodule    Depression    Hemorrhoid    Insomnia     No past medical history on file.  Other Medical History (per patient report): None    Past Surgical History:   Procedure Laterality Date    WISDOM TOOTH EXTRACTION  2019     No Known  Allergies    Traumatic History:    Abuse: None  Other Traumatic Events: none    Social History:    Birthplace: [facility]  Developmental History: normal development  Language (s) Spoken: English  Current Living Situation: lives in home with mother, stepfather, and step brother and 2 half brothers.  Relationship Status: single  Children: none  Social Support System: good support system    Educational History:    Highest level of education: high school graduate  School performance: D - Below Average Performance  Learning disability: None  Special education classes: No  Attention problems/ADHD: None  Behavior problems:  very talkative.    Vocational History:    Current occupation: currently employed, full time  Length of current employment: 2 years  Work problems: none    Financial Status:    No current financial problems     Service:    None    Legal History:    Involvement in Criminal Justice System: None  Current Litigation: None  POA: no    Drug Use (Past and Current):    Source of information: client  Tobacco: never smoked  Alcohol: social drinker  Caffeine:  10 cups/day  Illicit Substances: Marijuana: vapes on a daily basis and reports he does not have the medical marijuana card.  Treatment History: None  Consequence of substance use: None      Family History:    Family History   Problem Relation Age of Onset    No Known Problems Mother     No Known Problems Father     No Known Problems Brother     No Known Problems Brother     Heart attack Paternal Grandfather          LEISURE ASSESSMENT:    Interest: playing video games, going for walks outside, and loves spending time with his friends and his family.      RECENT STRESSORS:    Occupational: feels underqualified.      MENTAL STATUS EXAMINATION:    Appearance age appropriate, casually dressed, adequate hygiene and grooming   Prosthetic Devices no ambulatory assistive devices, no hearing aids   Behavior pleasant, cooperative, calm, good eye contact    Speech normal rate, normal volume, normal pitch   Mood euthymic, appropriate   Affect normal range and intensity, appropriate, mood-congruent   Thought Processes organized, goal directed, logical   Associations intact associations   Thought Content no overt delusions   Perceptual Disturbances no auditory hallucinations, no visual hallucinations   Abnormal Thoughts  Risk Potential Suicidal ideation - None  Homicidal ideation - None  Potential for aggression - No   Orientation oriented to person, place, time/date, and situation   Memory recent and remote memory grossly intact   Consciousness alert and awake   Attention Span  Concentration Span attention span and concentration are age appropriate   Intellect appears to be of average intelligence   Insight intact   Judgement intact   Muscle Strength and  Gait normal muscle strength and normal muscle tone, normal gait and normal balance   Motor Activity no abnormal movements   Language no difficulty naming common objects, no difficulty repeating a phrase, no difficulty writing a sentence   Fund of Knowledge adequate knowledge of current events  adequate fund of knowledge regarding past history  adequate fund of knowledge regarding vocabulary        SUICIDE/HOMICIDE RISK ASSESSMENT:    Risk of Harm to Self:  The following ratings are based on assessment at the time of the interview  Demographic risk factors include: none  Historical Risk Factors include: none  Recent Specific Risk Factors include: none  Protective Factors: no current suicidal ideation  Weapons: none. The following steps have been taken to ensure weapons are properly secured: not applicable  Based on today's assessment, Edgardo presents the following risk of harm to self: minimal    Risk of Harm to Others:  The following ratings are based on assessment at the time of the interview  Demographic risk factors include: none  Historical Risk Factors include: none  Recent Specific Risk Factors include:  none  Protective Factors: no current homicidal ideation  Weapons: none. The following steps have been taken to ensure weapons are properly secured: not applicable  Based on today's assessment, Edgardo presents the following risk of harm to others: minimal      ASSESSMENT/PLAN:   Mr. Edgardo Bazan Parviz Mr. Reynoso will return for psychological testing which includes administration of Wechsler Adult Intelligence Scale - Fourth Edition, Ruston Adaptive Behavior Scales - Third Edition (Ruston-3), (ADOS-2) Autism Diagnostic Observation Schedule - Second Edition, CASS-7, BREIF (self and informant), Social Responsiveness Scale-2 (self and informant), Adult Autism Spectrum Quotient, Ritvo Autism Asperger Disagnostic Scale-Revised (RAADS-R), MCMI-IV. .    CHARGING

## 2025-04-16 ENCOUNTER — OFFICE VISIT (OUTPATIENT)
Dept: FAMILY MEDICINE CLINIC | Facility: CLINIC | Age: 25
End: 2025-04-16
Payer: COMMERCIAL

## 2025-04-16 DIAGNOSIS — F33.1 MODERATE EPISODE OF RECURRENT MAJOR DEPRESSIVE DISORDER (HCC): ICD-10-CM

## 2025-04-16 DIAGNOSIS — E55.9 VITAMIN D DEFICIENCY: Primary | ICD-10-CM

## 2025-04-16 DIAGNOSIS — E53.8 B12 DEFICIENCY: ICD-10-CM

## 2025-04-16 PROCEDURE — 99214 OFFICE O/P EST MOD 30 MIN: CPT | Performed by: FAMILY MEDICINE

## 2025-04-16 RX ORDER — VITAMIN B COMPLEX
1 CAPSULE ORAL DAILY
Start: 2025-04-16

## 2025-04-16 NOTE — PROGRESS NOTES
Name: Edgardo Jj      : 2000      MRN: [id number]  Encounter Provider: Rohith Zapata MD  Encounter Date: 2025   Encounter department: [facility]  :  Assessment & Plan  Vitamin D deficiency  Currently on vitamin D supplement from psychiatry.  Continue.  Check in approximately 6 months.  Orders:  •  Vitamin D 25 hydroxy; Future    Moderate episode of recurrent major depressive disorder (HCC)           B12 deficiency  B12 deficiency noted on the blood work.  Recommend multivitamin.  Should include B complex, including B12.  Would prefer this over injectables.  If he is not having a rise in the B12 that recheck in 6 months, I would consider checking for methylmalonic acid.  Orders:  •  Vitamin B12; Future  •  b complex vitamins capsule; Take 1 capsule by mouth daily          1. Vitamin D deficiency  Assessment & Plan:  Currently on vitamin D supplement from psychiatry.  Continue.  Check in approximately 6 months.  Orders:  •  Vitamin D 25 hydroxy; Future    Orders:  -     Vitamin D 25 hydroxy; Future; Expected date: 10/16/2025  2. Moderate episode of recurrent major depressive disorder (HCC)  Assessment & Plan:           3. B12 deficiency  Assessment & Plan:  B12 deficiency noted on the blood work.  Recommend multivitamin.  Should include B complex, including B12.  Would prefer this over injectables.  If he is not having a rise in the B12 that recheck in 6 months, I would consider checking for methylmalonic acid.  Orders:  •  Vitamin B12; Future  •  b complex vitamins capsule; Take 1 capsule by mouth daily    Orders:  -     Vitamin B12; Future; Expected date: 10/16/2025  -     b complex vitamins capsule; Take 1 capsule by mouth daily    Chief Complaint   Patient presents with   • Headache   • Diabetes            History of Present Illness   Multiple issues.    Regard to depression and anxiety, the patient was seen by psychiatry.  Does have follow-up with them as  well.  They recommended that he decrease the Zoloft well taking the Lexapro, making that change for him.  He feels that that is working relatively well at this point.  Still on trazodone as well as Wellbutrin.    Reviewed labs.  Please see copies on the chart.  Vitamin D was low.  Psychiatry did start him on vitamin D supplementation with 50,000 units weekly.  Vitamin B12 is also slightly low.          Review of Systems   Constitutional: Negative.    HENT: Negative.     Musculoskeletal: Negative.    Skin: Negative.    Psychiatric/Behavioral:  Negative for suicidal ideas.        Objective   There were no vitals taken for this visit.     Folate 10.4.  B12 161, slightly low versus 180.  TSH 1.118.  Sodium 138, potassium 4.9, calcium 9.4.  Blood sugar 100.  Creatinine 0.73, .  AST 12, ALT 13.  White count 6.48, hemoglobin 13.4, hematocrit 42.7, platelets 265.  Vitamin D 12.4.    Physical Exam  Vitals and nursing note reviewed.

## 2025-04-16 NOTE — PATIENT INSTRUCTIONS
1. Vitamin D deficiency  Assessment & Plan:  Currently on vitamin D supplement from psychiatry.  Continue.  Check in approximately 6 months.  Orders:    Vitamin D 25 hydroxy; Future    Orders:  -     Vitamin D 25 hydroxy; Future; Expected date: 10/16/2025  2. Moderate episode of recurrent major depressive disorder (HCC)  Assessment & Plan:           3. B12 deficiency  Assessment & Plan:  B12 deficiency noted on the blood work.  Recommend multivitamin.  Should include B complex, including B12.  Would prefer this over injectables.  If he is not having a rise in the B12 that recheck in 6 months, I would consider checking for methylmalonic acid.  Orders:    Vitamin B12; Future    b complex vitamins capsule; Take 1 capsule by mouth daily    Orders:  -     Vitamin B12; Future; Expected date: 10/16/2025  -     b complex vitamins capsule; Take 1 capsule by mouth daily      COVID 19 Instructions    Edgardo Jj was advised to limit contact with others to essential tasks such as getting food, medications, and medical care.    Proper handwashing reviewed, and Hand sanitzer when washing is not available.    If the patient develops symptoms of COVID 19, the patient should call the office as soon as possible.    It is strongly recommended that Flu Vaccinations be obtained.      Virtual Visits:  AmWell: This works on smart phones (any phone with Internet browsing capability).  You should get a text message when the provider is ready to see you.  Click on the link in the text message, and the call should start.  You will need to type in your name, and allow camera and microphone access.  This is HIPPA compliant, and secure.      If you have not already done so, get immunized to COVID 19.      We are committed to getting you vaccinated as soon as possible and will be closely following CDC and [facility] guidelines as they are released and revised.  Please refer to our COVID-19 vaccine webpage for the most up to date  information on the vaccine and our distribution efforts.    This site will also have the most up to date recommendations for COVID booster vaccine.    [email or web address]    Call [phone] ([phone]), option 7    You can also visit https://www.vaccines.gov/ to find vaccines in your area.    OUR LOCATION:    On license of [facility]  [address] [place] [address], [address]  [place], [place], [address] [phone]  Fax: [phone]    Lab services, Rheumatology, and OB/GYN are at this location as well.

## 2025-04-16 NOTE — ASSESSMENT & PLAN NOTE
Currently on vitamin D supplement from psychiatry.  Continue.  Check in approximately 6 months.  Orders:  •  Vitamin D 25 hydroxy; Future

## 2025-04-16 NOTE — ASSESSMENT & PLAN NOTE
B12 deficiency noted on the blood work.  Recommend multivitamin.  Should include B complex, including B12.  Would prefer this over injectables.  If he is not having a rise in the B12 that recheck in 6 months, I would consider checking for methylmalonic acid.  Orders:  •  Vitamin B12; Future  •  b complex vitamins capsule; Take 1 capsule by mouth daily

## 2025-04-17 ENCOUNTER — CLINICAL SUPPORT (OUTPATIENT)
Age: 25
End: 2025-04-17
Payer: COMMERCIAL

## 2025-04-17 DIAGNOSIS — F33.1 MODERATE EPISODE OF RECURRENT MAJOR DEPRESSIVE DISORDER (HCC): Primary | ICD-10-CM

## 2025-04-17 PROCEDURE — 96139 PSYCL/NRPSYC TST TECH EA: CPT | Performed by: COUNSELOR

## 2025-04-17 PROCEDURE — 96138 PSYCL/NRPSYC TECH 1ST: CPT | Performed by: COUNSELOR

## 2025-04-17 NOTE — PSYCH
Tere MEDRANO, administered psychological testing to Edgardo Jj on 4/17/2025.    Start time: 8:06 AM  End time: 10:46 AM  Scoring time: 30 minutes  Total time: 190 minutes (3h 10m)    Weschler Adult Intelligence Scale (WAIS-IV)  Scale Sum of Scaled Score Composite Score Percentile Rank   Verbal Comprehension (VCI) 44 127 96   Perceptual Reasoning (LING) 31 102 55   Working Memory (WMI) 27 119 90   Processing Speed (PSI) 15 86 18   Full Scale (FSIQ) 117 111 77     RAADS  Total: 165    Behavior Rating Inventory of Executive Functioning (BRIEF)-Adult Version (BRIEF-A)  Scale/Index Raw Score T Score    Inhibit 22 84   Shift 14 73   Emotional Control 28 78   Self-Monitor 18 89   Behavioral Regulation Index 82 87   Initiate 22 82   Working Memory 23 89   Plan/Organize 28 86   Task Monitor 16 81   Organization of Materials 24 81   [place] cognition Index  113 90   Global Executive Composite  195 92      Validity Scale  Raw Score    Negativity 5   Infrequency  0   Inconsistency  2     CASS-7  Total: 18    SRS-2   Self  Total raw score: 134  T score: 83     DSM-5 compatible subscales   Social communication and interaction: Raw Score: 101, T-Score: 79  Restricted interests and repetitive behavior: Raw Score: 33, T-Score: 95     Informant  Total raw score: 49  T-Score: 53     DSM-5 compatible subscales   Social communication and interaction: Raw score: 43, T-score: 54  Restricted interests and repetitive behavior: Raw score: 6, T-score:  50    Behavior Rating Inventory of Executive Functioning (BRIEF)-Adult Version (BRIEF-A) Informant   Scale/Index Raw Score T Score    Inhibit 14 57   Shift 11 57   Emotional Control 18 54   Self-Monitor 10 51   Behavioral Regulation Index 53 55   Initiate 17 62   Working Memory 12 52   Plan/Organize 19 58   Task Monitor 13 65   Organization of Materials 23 72   [place] cognition Index  84 64   Global Executive Composite  137 60      Validity Scale  Raw Score    Negativity 1   Infrequency  0    Inconsistency  2     Bonduel-3  ABC Standard Score 90% Confidence Interval   Adaptive Behavior Composite 90 87-93   DOMAINS     Communication 97    Daily Living Skills 103    Socialization 81 77-85     Subdomains Raw Score V-Scale Score   Communication Domain     Receptive 76 12   Expressive 98 16   Written 76 17   Daily Living Skills Domain     Personal 110 16   Domestic 58 13   Community 116 18   Socialization Domain     Interpersonal Relationships 77 11   Play and Leisure 71 13   Coping Skills 65 15     MCMI-IV  VALIDITY    Modifying Indices    Disclosure  Desirability  Debasement 67  6  24   PERSONALITY    Clinical Personality Patterns    AASchd  SRAvoid  DFMelan  DADepn  SPHistr  EETurbu  CENarc  ADAntis  ADSadis  RCComp  DRNegat  AAMasoc 10  23  28  23  5  4  7  9  7  12  12  24   Severe Personality Pathology    ESSchizoph  UBCycloph  MPParaph 24  21  10   PSYCHOPATHOLOGY    Clinical Syndromes    GENanx  SOMsym  BIPspe  PERdep  ALCuse  DRGuse  P-Tstr 14  13  13  26  4  3  4   Severe Clinical Syndromes    SCHspe  MAJdep  DELdis 15  19  4   FACET SCALES    DADepn    Expressively Puerile  Interpersonally Submissive  Inept Self-Image 9  7  9   DFMelan    Cognitively Fatalistic  Worthless Self-Image  Temperamentally Woeful 9  9  9   SRAvoid    Interpersonally Aversive  Alienated Self-Image  Vexatious Content 7  8  8     CPT 27444: 1 unit  CPT 48053: 5 units

## 2025-04-22 DIAGNOSIS — F33.1 MODERATE EPISODE OF RECURRENT MAJOR DEPRESSIVE DISORDER (HCC): ICD-10-CM

## 2025-04-23 RX ORDER — BUPROPION HYDROCHLORIDE 150 MG/1
150 TABLET ORAL EVERY MORNING
Qty: 30 TABLET | Refills: 0 | Status: SHIPPED | OUTPATIENT
Start: 2025-04-23

## 2025-04-24 NOTE — TELEPHONE ENCOUNTER
Patient next follow up:    NEXT APPT  With Family Medicine (Rohith Zapata MD)  05/08/2025 at 1:45 PM

## 2025-04-24 NOTE — TELEPHONE ENCOUNTER
Psychiatry felt that this was a one-time consult, patient had talked to me about the fact that he was following up with them.  I would recommend follow-up with office visit then to discuss further.  Will need to continue to follow-up.  Agree with continuing on the medication.

## 2025-05-01 ENCOUNTER — OFFICE VISIT (OUTPATIENT)
Age: 25
End: 2025-05-01
Payer: COMMERCIAL

## 2025-05-01 DIAGNOSIS — F33.1 MODERATE EPISODE OF RECURRENT MAJOR DEPRESSIVE DISORDER (HCC): Primary | ICD-10-CM

## 2025-05-01 DIAGNOSIS — F84.0 AUTISM: ICD-10-CM

## 2025-05-01 PROCEDURE — 96137 PSYCL/NRPSYC TST PHY/QHP EA: CPT | Performed by: COUNSELOR

## 2025-05-01 PROCEDURE — 96136 PSYCL/NRPSYC TST PHY/QHP 1ST: CPT | Performed by: COUNSELOR

## 2025-05-01 NOTE — PSYCH
Psychological testing completed by psychologist.      Edgardo completed the ADOS-2 and MDQ     Administration  Start Time: 0905  End time: 0946     Scoring   Start time: 0948  End time: 1008        On the ADOS-2, the following results were obtained:   Communication Total- 3  Reciprocal Social interaction- 5  Communication and social interaction Total- 8  Imagination/ creativity-0  Stereotyped Behaviors and Restricted Interest- 2        MDQ: 9/13, yes, moderate

## 2025-05-07 DIAGNOSIS — F41.0 GENERALIZED ANXIETY DISORDER WITH PANIC ATTACKS: ICD-10-CM

## 2025-05-07 DIAGNOSIS — F33.1 MODERATE EPISODE OF RECURRENT MAJOR DEPRESSIVE DISORDER (HCC): ICD-10-CM

## 2025-05-07 DIAGNOSIS — F41.1 GENERALIZED ANXIETY DISORDER WITH PANIC ATTACKS: ICD-10-CM

## 2025-05-07 NOTE — TELEPHONE ENCOUNTER
Contacted patient in regards to medication refill. Spoke with patient and made him aware that PCP will need to refill medication. Patient verbalized understanding and states he will speak with his PCP tomorrow at his scheduled appt.

## 2025-05-08 ENCOUNTER — OFFICE VISIT (OUTPATIENT)
Dept: FAMILY MEDICINE CLINIC | Facility: CLINIC | Age: 25
End: 2025-05-08
Payer: COMMERCIAL

## 2025-05-08 VITALS
OXYGEN SATURATION: 98 % | SYSTOLIC BLOOD PRESSURE: 100 MMHG | HEART RATE: 78 BPM | DIASTOLIC BLOOD PRESSURE: 62 MMHG | HEIGHT: 71 IN | WEIGHT: 270 LBS | BODY MASS INDEX: 37.8 KG/M2

## 2025-05-08 DIAGNOSIS — F84.0 AUTISM: ICD-10-CM

## 2025-05-08 DIAGNOSIS — E53.8 B12 DEFICIENCY: ICD-10-CM

## 2025-05-08 DIAGNOSIS — F33.1 MODERATE EPISODE OF RECURRENT MAJOR DEPRESSIVE DISORDER (HCC): Primary | ICD-10-CM

## 2025-05-08 PROCEDURE — 99214 OFFICE O/P EST MOD 30 MIN: CPT | Performed by: FAMILY MEDICINE

## 2025-05-08 RX ORDER — ESCITALOPRAM OXALATE 20 MG/1
20 TABLET ORAL DAILY
Qty: 30 TABLET | Refills: 5 | Status: SHIPPED | OUTPATIENT
Start: 2025-05-08

## 2025-05-08 NOTE — PROGRESS NOTES
"Name: Edgardo Jj      : 2000      MRN: [id number]  Encounter Provider: Rohith Zapata MD  Encounter Date: 2025   Encounter department: [facility]  :  Assessment & Plan  Moderate episode of recurrent major depressive disorder (HCC)  Continues on trazodone 50 mg at bedtime sleep is okay with this.  Continue Lexapro at 20.  Also on Wellbutrin 150.  No change at the moment.  In the future, he can certainly follow-up with psychiatry.  For right now, I do feel that counseling is the best option for him in addition to the medications that he is currently on.           B12 deficiency  On multivitamins.  Continue.       Autism  Patient is following with psychology.  They do feel that he has aspects of autism.  They have a follow-up visit coming up.  Will have further evaluation based on that.              History of Present Illness   Patient is here to follow-up on several issues.    I did review the refill request from Lexapro.  Psychiatry is recommended to titrate up to 20 mg, and had initiated that within.    On the 20mg Lexapro, he notes ffeeling more neutral toward things, not really better, but less bothered.  Continues with Trazodone as well.    Still in counseling.          Review of Systems   Constitutional: Negative.    HENT: Negative.     Eyes: Negative.    Respiratory: Negative.     Cardiovascular: Negative.    Gastrointestinal: Negative.    Endocrine: Negative.    Genitourinary: Negative.    Musculoskeletal: Negative.    Skin: Negative.    Allergic/Immunologic: Negative.    Neurological: Negative.    Hematological: Negative.    Psychiatric/Behavioral: Negative.         Objective   /62 (BP Location: Left arm, Patient Position: Sitting, Cuff Size: Standard)   Pulse 78   Ht 5' 11\" (1.803 m)   Wt 122 kg (270 lb)   SpO2 98%   BMI 37.66 kg/m²      Physical Exam  Vitals and nursing note reviewed.   Constitutional:       Appearance: Normal appearance. "   Neurological:      Mental Status: He is alert.

## 2025-05-08 NOTE — ASSESSMENT & PLAN NOTE
Continues on trazodone 50 mg at bedtime sleep is okay with this.  Continue Lexapro at 20.  Also on Wellbutrin 150.  No change at the moment.  In the future, he can certainly follow-up with psychiatry.  For right now, I do feel that counseling is the best option for him in addition to the medications that he is currently on.

## 2025-05-08 NOTE — ASSESSMENT & PLAN NOTE
Patient is following with psychology.  They do feel that he has aspects of autism.  They have a follow-up visit coming up.  Will have further evaluation based on that.

## 2025-05-08 NOTE — PATIENT INSTRUCTIONS
1. Moderate episode of recurrent major depressive disorder (HCC)  Assessment & Plan:  Continues on trazodone 50 mg at bedtime sleep is okay with this.  Continue Lexapro at 20.  Also on Wellbutrin 150.  No change at the moment.  In the future, he can certainly follow-up with psychiatry.  For right now, I do feel that counseling is the best option for him in addition to the medications that he is currently on.           2. B12 deficiency  Assessment & Plan:  On multivitamins.  Continue.       3. Autism  Assessment & Plan:  Patient is following with psychology.  They do feel that he has aspects of autism.  They have a follow-up visit coming up.  Will have further evaluation based on that.           COVID 19 Instructions    Edgardo Jj was advised to limit contact with others to essential tasks such as getting food, medications, and medical care.    Proper handwashing reviewed, and Hand sanitzer when washing is not available.    If the patient develops symptoms of COVID 19, the patient should call the office as soon as possible.    It is strongly recommended that Flu Vaccinations be obtained.      Virtual Visits:  You should get a text message when the provider is ready to see you.  Click on the link in the text message, and the call should start.  Make sure you allow camera and microphone access.  This is HIPPA compliant, and secure.  Also, you could access this visit from [employer] in the [employer] gwen.      If you have not already done so, get immunized to COVID 19.      We are committed to getting you vaccinated as soon as possible and will be closely following CDC and [facility] guidelines as they are released and revised.  Please refer to our COVID-19 vaccine webpage for the most up to date information on the vaccine and our distribution efforts.    This site will also have the most up to date recommendations for COVID booster  vaccine.    [email or web address]    Call [phone]-[phone] ([phone]), option 7    You can also visit https://www.vaccines.gov/ to find vaccines in your area.    OUR LOCATION:    [facility]  [address] [place] [address], [address]  [place], [place], [address] [phone]  Fax: [phone]    Lab services, Rheumatology, and OB/GYN are at this location as well.

## 2025-05-20 DIAGNOSIS — F33.1 MODERATE EPISODE OF RECURRENT MAJOR DEPRESSIVE DISORDER (HCC): ICD-10-CM

## 2025-05-20 RX ORDER — BUPROPION HYDROCHLORIDE 150 MG/1
150 TABLET ORAL EVERY MORNING
Qty: 90 TABLET | Refills: 1 | OUTPATIENT
Start: 2025-05-20

## 2025-05-20 NOTE — TELEPHONE ENCOUNTER
Currently providing coverage for Dr. Samaniego while he is out of office.    This patient was seen by Dr. Samaniego as a one time consultation. All refills of this medication should be directed to his PCP moving forward. Therefore please forward requests to Dr. Zapata.

## 2025-05-27 ENCOUNTER — TELEMEDICINE (OUTPATIENT)
Age: 25
End: 2025-05-27
Payer: COMMERCIAL

## 2025-05-27 DIAGNOSIS — F39 UNSPECIFIED MOOD (AFFECTIVE) DISORDER (HCC): ICD-10-CM

## 2025-05-27 DIAGNOSIS — F41.9 ANXIETY: Primary | ICD-10-CM

## 2025-05-27 PROCEDURE — 96131 PSYCL TST EVAL PHYS/QHP EA: CPT | Performed by: COUNSELOR

## 2025-05-27 PROCEDURE — 96130 PSYCL TST EVAL PHYS/QHP 1ST: CPT | Performed by: COUNSELOR

## 2025-05-29 ENCOUNTER — DOCUMENTATION (OUTPATIENT)
Age: 25
End: 2025-05-29

## 2025-05-29 PROBLEM — F39 UNSPECIFIED MOOD (AFFECTIVE) DISORDER (HCC): Status: ACTIVE | Noted: 2025-05-29

## 2025-05-29 PROBLEM — F41.9 ANXIETY: Status: ACTIVE | Noted: 2025-05-29

## 2025-05-29 NOTE — PROGRESS NOTES
REFERRAL/PRESENTING INFORMATION:   Edgardo is a 24-year-old male who presents for psychological evaluation upon referral from Naveed Samaniego, for assessment of Autism Spectrum Disorder. The history, as reported below, incorporates information obtained through medical record review, patient report, and clinical observation as applicable. Edgardo has a history of Unspecified Depressive Disorder who presents for psychological testing intake. Primary complaints include not doing well in social settings, changed to online school during high school, has sound sensitivities and does not like gritty textures. He reports he struggles with developing and maintaining a routine, is very interested in fantasy, Dungeons and Dragons, and history. When asked, Edgardo denies experiencing any delays in his developmental period.     Current Psychiatrist: Dr. Naveed Samaniego      Current Therapist: Therapist with outside practice     REVIEW OF SYMPTOMS:     Cognition:     Attention: problem focusing for long periods and difficulty following instructions  Processing Speed: taking longer to complete tasks.  Learning and Memory: problems with short term memory  Executive Functioning: increased impulsivity  Non-Verbal/Visual Skills: difficulties distinguishing left/right.  Speech/Language: Normal     Mood/Behavior/Other Psychiatric Issues:     Depressive Symptoms: anhedonia, sadness, hopelessness, helplessness, low energy, low motivation, decreased interest, poor concentration, ruminations, negative thoughts, mood swings, passive death wish.  Mood Instability Symptoms: mood swings, difficulty sleeping.  Anxiety Symptoms: daily anxiety symptoms, feeling nervous, worrying, worrying daily, worrying about everyday issues, poor concentration, feeling agitated, racing thoughts, anxiety in social situations.  Psychotic Symptoms: unremarkable  Eating Disorder Symptoms: unremarkable  Behavioral Problems: unremarkable     ADLs/IADLs:    "  Independent/Normal ADLs/IADLs     Additional Symptoms:     Sensory/Motor: None  Physical: Edgardo reports he recently fainted before going to his doctor's appt due to being \"really nervous\".   Sleep: 7 to 10 hours of sleep per night  Energy: low energy level and daytime fatigue      CURRENT MEDICATIONS:  Current Medications      Current Outpatient Medications:    buPROPion (WELLBUTRIN XL) 150 mg 24 hr tablet, Take 1 tablet (150 mg total) by mouth every morning, Disp: 30 tablet, Rfl: 0   ergocalciferol (VITAMIN D2) 50,000 units, Take 1 capsule (50,000 Units total) by mouth once a week, Disp: 12 capsule, Rfl: 0   escitalopram (LEXAPRO) 10 mg tablet, Take 1 tablet (10 mg total) by mouth daily for 14 days, THEN 2 tablets (20 mg total) daily., Disp: 75 tablet, Rfl: 0   fluticasone (FLONASE) 50 mcg/act nasal spray, 2 sprays into each nostril daily (Patient taking differently: 2 sprays into each nostril daily as needed for allergies), Disp: Rfl:    loratadine (CLARITIN) 10 mg tablet, Take 10 mg by mouth daily (Patient taking differently: Take 10 mg by mouth daily as needed for allergies), Disp: , Rfl:    montelukast (SINGULAIR) 10 mg tablet, Take 1 tablet (10 mg total) by mouth daily at bedtime (Patient not taking: Reported on 3/25/2025), Disp: 100 tablet, Rfl: 1   naproxen sodium (ALEVE) 220 MG tablet, Take 2 tablets (440 mg total) by mouth 2 (two) times a day with meals (Patient taking differently: Take 440 mg by mouth 2 (two) times a day as needed for mild pain), Disp: , Rfl:    sertraline (ZOLOFT) 100 mg tablet, Take 1 tablet (100 mg total) by mouth daily for 14 days - Then stop this medication, Disp: , Rfl:    traZODone (DESYREL) 50 mg tablet, TAKE 1 TABLET BY MOUTH EVERYDAY AT BEDTIME, Disp: 90 tablet, Rfl: 1   Other medications (per patient report): None        HISTORY:     Personal History:     Psychiatric History:  Psychiatric Hospitalizations:   No history of past inpatient psychiatric admissions  Suicide " Attempts:   None  History of self-harm:   None  Violence History:   no     Medical History:     Problem List       Patient Active Problem List   Diagnosis    Nausea    Obesity    Allergic rhinitis    Fatty liver    Liver nodule    Depression    Hemorrhoid    Insomnia         Past Medical History   No past medical history on file.   Other Medical History (per patient report): None     Past Surgical History         Past Surgical History:   Procedure Laterality Date    WISDOM TOOTH EXTRACTION   2019         Allergies   No Known Allergies      Traumatic History:     Abuse: None  Other Traumatic Events: none     Social History:     Birthplace: [facility]  Developmental History: normal development  Language (s) Spoken: English  Current Living Situation: lives in home with mother, stepfather, stepbrother and 2 half-brothers.  Relationship Status: single  Children: none  Social Support System: good support system     Educational History:     Highest level of education: high school graduate  School performance: D - Below Average Performance  Learning disability: None  Special education classes: No  Attention problems/ADHD: None  Behavior problems: very talkative.     Vocational History:     Current occupation: currently employed, full time.  Length of current employment: 2 years  Work problems: none.     Financial Status:     No current financial problems      Service:     None     Legal History:     Involvement in Criminal Justice System: None  Current Litigation: None  POA: no     Drug Use (Past and Current):     Source of information: client  Tobacco: never smoked  Alcohol: social drinker  Caffeine: 10 cups/day  Illicit Substances: Marijuana: vapes on a daily basis and reports he does not have the medical marijuana card.  Treatment History: None  Consequence of substance use: None        Family History:     Family History         Family History   Problem Relation Age of Onset    No Known Problems Mother      No  Known Problems Father      No Known Problems Brother      No Known Problems Brother      Heart attack Paternal Grandfather                 LEISURE ASSESSMENT:     Interest: playing video games, going for walks outside, and loves spending time with his friends and his family.        RECENT STRESSORS:     Occupational: feels underqualified.        MENTAL STATUS EXAMINATION:     Appearance age appropriate, casually dressed, adequate hygiene and grooming   Prosthetic Devices no ambulatory assistive devices, no hearing aids   Behavior pleasant, cooperative, calm, good eye contact   Speech normal rate, normal volume, normal pitch   Mood euthymic, appropriate   Affect normal range and intensity, appropriate, mood-congruent   Thought Processes organized, goal directed, logical   Associations intact associations   Thought Content no overt delusions   Perceptual Disturbances no auditory hallucinations, no visual hallucinations   Abnormal Thoughts  Risk Potential Suicidal ideation - None  Homicidal ideation - None  Potential for aggression - No   Orientation oriented to person, place, time/date, and situation   Memory recent and remote memory grossly intact   Consciousness alert and awake   Attention Span  Concentration Span attention span and concentration are age appropriate   Intellect appears to be of average intelligence   Insight intact   Judgement intact   Muscle Strength and  Gait normal muscle strength and normal muscle tone, normal gait and normal balance   Motor Activity no abnormal movements   Language no difficulty naming common objects, no difficulty repeating a phrase, no difficulty writing a sentence   Fund of Knowledge adequate knowledge of current events  adequate fund of knowledge regarding past history  adequate fund of knowledge regarding vocabulary          SUICIDE/HOMICIDE RISK ASSESSMENT:     Risk of Harm to Self:  The following ratings are based on assessment at the time of the interview  Demographic  risk factors include: none  Historical Risk Factors include: none  Recent Specific Risk Factors include: none  Protective Factors: no current suicidal ideation  Weapons: none. The following steps have been taken to ensure weapons are properly secured: not applicable  Based on today's assessment, Edgardo presents the following risk of harm to self: minimal     Risk of Harm to Others:  The following ratings are based on assessment at the time of the interview  Demographic risk factors include: none  Historical Risk Factors include: none  Recent Specific Risk Factors include: none  Protective Factors: no current homicidal ideation  Weapons: none. The following steps have been taken to ensure weapons are properly secured: not applicable  Based on today's assessment, Edgardo presents the following risk of harm to others: minimal        ASSESSMENT/PLAN:   Procedures:  Review of Chart  Clinical Interview   Wechsler Adult Intelligence Scale Fourth Edition (WAIS-IV)  Autism Diagnostic Observation Scale, 2nd Edition (ADOS-2)- Module 4  Ritvo Autism Asperger Diagnostic Scale-Revised (RAADS-R)  [place] Adaptive Behavior Scales, Third Edition (Trona-3)  Social Responsiveness Scale, Second Edition (SRS-2)  The [place] Clinical Multiaxial Inventory-Fourth Edition, (MCMI-IV)   Behavior Rating Inventory of Executive Functioning (BRIEF)-Adult Version (BRIEF-A)  Mood Disorder questionnaire (MDQ)     Evaluation Results  Wechsler Adult Intelligence Scale Fourth Edition (WAIS-IV)  The Wechsler Adult Intelligence Scale Fourth Edition (WAIS-IV) is an individually administered, comprehensive clinical instrument used for assessing the intelligence of Adults ages 16 to 90-years-old. The WAIS-IV provides Primary Index Scores that represent intellectual functioning in specified cognitive areas (Verbal Comprehension, Perceptual Reasoning, Working Memory, and Processing Speech) and a composite score that represents general intellectual ability (Full  Scale I.Q.).    It is important to recognize that there is always some degree of measurement error in testing. Therefore, a 95% confidence interval is listed in order to provide a range in which a person is likely to function. These scores indicate that 95% of the time a person's score would fall within the indicated range.    Edgardo's unique set of thinking and reasoning abilities make his overall intellectual functioning difficult to summarize by a single score on the Wechsler Adult Intelligence Scale-Fourth Edition (WAIS-IV). His verbal reasoning abilities are much better developed than his nonverbal reasoning abilities. Making sense of complex verbal information and using verbal abilities to solve novel problems is a strength for Edgardo. Processing complex visual information by forming spatial images of part-whole relationships and/or by manipulating the parts to solve novel problems without using words is a less developed ability.    Verbal Comprehension  Isreals verbal reasoning abilities as measured by the Verbal Comprehension Index (VCI) are in the superior range and above those of approximately 96% of his peers (VCI = 127; 95% confidence interval = 120-132). The VCI is designed to measure verbal reasoning and concept formation. Edgardo's performance on the verbal subtests contributing to the VCI presents a diverse set of verbal abilities, as he performed much better on some verbal tasks than others. The degree of variability is unusual and may be noticeable to those who know him well.     Perceptual Reasoning  Isreals nonverbal reasoning abilities as measured by the Perceptual Reasoning Index (LING) are in the average range and above those of approximately 55% of his peers (LING = 102; 95% confidence interval = ). The LING is designed to measure fluid reasoning in the perceptual domain with tasks that assess nonverbal concept formation, visual perception and organization, visual-motor coordination, learning, and  the ability to separate figure and ground in visual stimuli. Edgardo's performance on the perceptual reasoning subtests contributing to the LING is somewhat variable, although the magnitude of this difference in performance is not unusual among individuals his age.     Working Memory  Edgardo's ability to sustain attention, concentrate, and exert mental control is in the high average range. He performed better than approximately 90% of his peers in this area (Working Memory Index [WMI] = 119; 95% confidence interval 111-125).      Processing Speed  Edgardo's ability in processing simple or routine visual material without making errors is in the low average range when compared to his peers. He performed better than approximately 18% of his peers on the processing speed tasks (Processing Speed Index [PSI] = 86; 95% confidence interval 79-96). Processing visual material quickly is an ability that Edgardo performs poorly as compared to his verbal and nonverbal reasoning ability. Processing speed is an indication of the rapidity with which Edgardo can mentally process simple or routine information without making errors. Because learning often involves a combination of routine information processing (such as reading) and complex information processing (such as reasoning), a weakness in the speed of processing routine information may make the task of comprehending novel information more time-consuming and difficult for Edgardo. Thus, this weakness in simple visual scanning and tracking may leave him less time and mental energy for the complex task of understanding new material.    Summary  Edgardo is a 24-year-old male who completed the WAIS-IV. His overall cognitive ability, as evaluated by the WAIS-IV, cannot easily be summarized because his verbal reasoning abilities are much better developed than his nonverbal reasoning abilities. Edgardo's reasoning abilities on verbal tasks are generally in the superior range (VCI = 127), while his nonverbal  reasoning abilities are significantly lower and in the average range (LING = 102). Edgardo's ability to sustain attention, concentrate, and exert mental control is in the high average range (WMI = 119). Edgardo's ability in processing simple or routine visual material without making errors is in the low average range when compared to his peers (PSI = 86).    WAIS-IV Composite Score Summary:  Index Composite  Score Percentile      Confidence  Interval (95%) Qualitative  Description   Verbal Comprehension 127 96 120-132 Superior   Perceptual Reasoning 102 55  Average   Working Memory 119 90 111-125 High Average   Processing Speed 86 18 79-96 Low Average   Full Scale 111 77 107-115 High Average     Autism Diagnostic Observation Scale, 2nd Edition (ADOS-2) Module 4  The Autism Diagnostic Observation Scale (ADOS) is a semi-structured, standardized play-based assessment of social interaction, communication, play or imaginative use of materials that allows us to see an individual in a variety of different communicative contexts. It assesses whether an individual's communication, social interaction and play skills are consistent with autism or autistic spectrum disorder.    The ADOS consists of modules depending on the individual's communicative abilities. Module 4 of the ADOS is designed for use with older adolescents and adults who have fluent language skills and who have increased level of independence in terms of making everyday choices. The activities in Module 4 focuses on social, communicative and language behaviors. These activities combine unstructured conversation with a series of structured situation and interview questions.     Communication:   The communication rating for this evaluation is based on numerous assessments of communication style over the entire testing time. It focuses on how an individual uses words, vocalizations, and gestures (including pointing) am to engage others and communicate needs and  wants and information.     Edgardo was not observed expressing any stereotypical or idiosyncratic words or phrases. He provided some spontaneous elaboration of his own responses for the examiner's benefit. Edgardo was also observed using some descriptive and emotional gestures.     Reciprocal Social Interaction  The reciprocal social interaction rating for this evaluation is based on continuous assessment of the individual's attempts and style in engaging others in back-and-forth interaction both verbally and non-verbally. It focuses on how an individual uses and responds to words, vocalizations and gestures (including pointing), eye contact and facial expressions to request, to engage others and maintain an interaction during enjoyable tasks and free play.    During the observation Edgardo maintained appropriate eye contact. There were some facial expressions directed to the examiner and he communicated understanding of emotions in others.   Edgardo also acknowledged taking responsibility for his actions. There were also some limited responsiveness to social contexts and reduced reciprocal social communication.    Imagination   The imagination rating looks at creativity and inventiveness exhibits throughout the session in the uses of object or verbal descriptions. Edgardo did not demonstrate difficulty creating a novel story on the creating a story subtest.    Stereotyped Behaviors and Restricted Interests  Overall, Edgardo did not participate in restricted actions, interests, thoughts, or words. He did not demonstrate echolalia, stereotypic or rote phrases. Edgardo did not demonstrate repetitive self-harm.     Impression:  On the ADOS-2, Edgardo scored minimally in the area of concern for social affect and communication. Based on that Edgardo exhibits characteristics of Autism Spectrum Disorder but does not meet diagnostic criteria.     Iza Autism Asperger's Diagnostics Scale-Revised  Edgardo completed the RAADS-R a self-report measure where  individuals report on symptoms which may also be endorsed by individuals diagnosed with high functioning autism spectrum disorder.  The questions pertain to current symptoms as well as childhood symptoms. Edgardo obtained a score of 165 on this measure.      Keene Adaptive Behavior Scales, Third Edition (Keene-3)  The Keene-3 is a standardized measure of adaptive behavior--the things that people do to function in their everyday lives. Whereas ability measures focus on what the examinee can do in a testing situation, the Keene-3 focuses on what they actually do in daily life. Because it is a norm-based instrument, the examinee's adaptive functioning is compared to that of others their age.    Edgardo Jj was evaluated using the Keene-3 Comprehensive Interview Form on 04/17/2025. Jayne Valladares, Edgardo's mother, was interviewed by Tere Ulloa, who completed the form.    Edgardo's overall level of adaptive functioning is described by his score on the Adaptive Behavior Composite (ABC). His ABC score is 90, which is somewhat below the normative mean of 100 (the normative standard deviation is 15). The percentile rank for this overall score is 25.    The ABC score is based on scores for three specific adaptive behavior domains: Communication, Daily Living Skills, and Socialization. The domain scores are also expressed as standard scores with a mean of 100 and standard deviation of 15.    The Communication domain measures how well Edgardo listens and understands, expresses himself through speech, and reads and writes. His Communication standard score is 97. This corresponds to a percentile rank of 42.    The Daily Living Skills domain assesses Edgardo's performance of the practical, everyday tasks of living that are appropriate for his age. His standard score for Daily Living Skills is 103, which corresponds to a percentile rank of 58. This domain is a relative strength for Edgardo.    Edgardo's score for the  Socialization domain reflects his functioning in social situations. His Socialization standard score is 81. The percentile rank is 10. This domain is a relative weakness for Edgardo.  Adaptive Behavior Area Level Compared to Others of Similar Age   Communication Skills Adequate   Daily Living Skills Adequate   Social Skills and Relationships Moderately Low   Overall Summary Score Adequate     Social Responsiveness Scale, Second Edition (SRS-2)  The SRS-2 is a 65-item assessment that identifies social impairment associated with Autism Spectrum Disorder and quantifies its severity. The SRS-2 offers two [id number] subscales: Social Communication and Interaction and Restricted Interests and Repetitive Behavior. Scores on these subscales allow for comparison of one's symptoms to DSM-5 diagnostic criteria for Autism Spectrum Disorder.     Based on Edgardo's responses a T-Score of 83 was obtained. This score falls within the severe range. The severe symptom range is defined by T-scores of 76 and above. Individuals in this range display substantial social impairment that significantly impacts various aspects of their lives. They often require considerable support and specialized interventions to navigate social situations and develop functional social skills. It's vital to remember that these ranges provide a guideline for understanding the severity of social responsiveness challenges, and clinical judgment is crucial for developing personalized treatment plans.    Based on Edgardo's mother's responses of his behavior a T-Score of 53 was obtained. This score falls within the normal range.     The [place] Clinical Multiaxial Inventory-Fourth Edition, (MCMI-IV)   The MCMI-IV which is a 195 item self-report instrument designed to help clinicians assess personality and psychopathology in adults age 18 years or older who are undergoing a psychological or psychiatric assessment or treatment was administered. Base rates that fall in  the range of 60-74 are Normal Style. This means there is likely presence of traits; some may be problematic, yet still in “style” range. Next is the range of 75-84 which falls in the Abnormal Type. This is when there may be more defined dysfunction possible. And finally, is 85+ which is a Clinical Disorder. This indicates symptoms are likely at an impairing level. Based on Edgardo's responses, the following emerging personality patterns may be important adjuncts to the diagnostic process:  Dependent Personality Disorder, and Unspecified Personality Disorder (Melancholic) Disorder, with Avoidant Personality Type, and Unspecified Personality Disorder (Masochistic) Type. Based on Edgardo's responses, the following clinical concerns were identified: Major Depression (recurrent, severe), Adjustment Disorder with Anxiety, and Bipolar I Disorder (manic, severe).    Behavior Rating Inventory of Executive Functioning, Adult Version (BRIEF-A)  The BRIEF is a standardized rating scale developed to identify behaviors associated with specific domains of executive functioning such as Inhibit, Shift, Emotional Control, Self- Monitor, Initiate, Working Memory, Plan/Organize, Organization of Materials, and Task Monitor in adults ages 18 to 90 years.  T scores (M-50, SD=10) are used to interpret the individual's level of executive functioning. T-score at or above 65 are considered clinically significant.     The BRIEF-A consist of three primary indices.  The Global Executive Composite (GEC) is an overarching summary score that incorporates all the BRIEF-A clinical scales.  The Behavioral Regulation Index captures the ability to maintain appropriate regulatory control of one's own behavior and emotional responses.  This includes appropriate inhibition of thoughts and actions, flexibility and shifting problem-solving sets, modulation of emotional response and monitoring one's own action.  The Metacognition Index reflects the individual's  ability to initiate activity and generate problem-solving ideas to sustain working memory, to plan and organize problem-solving approaches to monitor success, failures, and problem solving, and to organize once materials and environment.    Based on Edgardo's responses concerns are noted with his ability to inhibit impulsive responses, adjust to changes in routine, modulate emotions, monitor social behavior, initiate problem-solving, sustain working memory, plan and organize problem-solving approaches, attend to task-oriented output, and organize environment and materials.    Based on Sarah's responses of Edgardo's behavior, concerns are noted with his ability to attend to task-oriented output and organize environment and materials.    Scale / Index - Description  At Risk = T 60-64  Clinical Significance = T > 65 Edgardo's T-Scores Sarah's T-  Scores   Inhibit - the ability to resist impulses and the ability to stop one's own behavior at the appropriate time. 84 57   Shift - the ability to move freely from one situation, activity, or aspect of a problem to another as the circumstances demand. 73 57   Emotional Control - ability to modulate or regulate his or her emotional responses. 78 54   Self-Monitor - the degree to which one is aware of the effect that his or her behavior has on others and how it compares with standards or expectations for behavior. 89 51   Behavioral Regulation Index (PREET)  87 55   Initiate - ability to begin a task or activity and to independently generate ideas, responses, or problem-solving strategies. 82 62   Working Memory - the capacity to hold information in mind for the purpose of completing a task; encoding information; or generating goals, plans, and sequential steps to achieve goals. 89 52   Plan/Organize - ability to manage current and future-oriented task demands 86 58   Task-Monitor - task-oriented monitoring or work-checking habits. 81 65   Organization of Materials - orderliness of  work, play, and storage spaces (e.g., desks, lockers, backpacks, and bedrooms). 81 72   Metacognition Index 90 64   Global Executive Composite (GEC) 92 60     Mood Disorders Questionnaire  The Mood Disorders questionnaire (MDQ) is a 15 items inventory that measures problems of elevated mood and/or maxine. The questionnaire reflects DSM diagnostic criteria for mood disorders. The MDQ specifies that symptoms are occurring during a period when you were not your usual self and while not using drugs or alcohol. Edgardo endorsed 9 out of 13 symptoms associated with maxine. He also reported that these symptoms have happened during the same period of time in the past, causing her to have “moderate problems”.     Summary and Recommendation  As previously reported, Edgardo is a 24-year-old male who presents for psychological evaluation upon referral from Naveed Samaniego, for assessment of Autism Spectrum Disorder. The history, as reported below, incorporates information obtained through medical record review, patient report, and clinical observation as applicable. Edgardo has a history of Unspecified Depressive Disorder who presents for psychological testing intake. Primary complaints include not doing well in social settings, changed to online school during high school, has sound sensitivities and does not like gritty textures. He reports he struggles with developing and maintaining a routine, is very interested in fantasy, Dungeons and Dragons, and history. When asked, Edgardo denies experiencing any delays in his developmental period.    When considering diagnoses, Edgardo does not meet the diagnostic criteria of Autism Spectrum Disorder based on the clinical interview, ADOS-2, BRIEF-3 and SRS-2. He does appear to be experiencing symptoms related to anxiety and based on the clinical interview and the MCMI-IV. Finally, Edgardo appears to be experiencing symptoms of Unspecified Mood Disorder based on the MCMI-IV and the MDQ. This is  recommended to be monitored by his treatment team to rule out a Bipolar Disorder.    Diagnostic Impressions  (F41.1) Generalized Anxiety Disorder  (F39) Unspecified Mood Disorder    Treatment Recommendations  Edgardo is encouraged to participate in weekly therapy to address symptoms related to anxiety and mood.  Edgardo would benefit from cognitive behavioral therapies to help identify thoughts and behavioral patterns that may be contributing to and or maintaining current clinical symptomology.  It would be beneficial to identify the cognitive distortions displayed and start challenging them using cognitive restructuring techniques.  Edgardo would benefit from relaxation strategies such as mindfulness skills, progressive muscle relaxation as well as grounding techniques.  Edgardo is encouraged to continue consulting with prescribing provider about appropriate medication management of symptoms.

## 2025-05-29 NOTE — PSYCH
Psychological Evaluation  [address] [address]  P: [phone] ? F: [phone]      Feedback from Psychological Evaluation    Mr. Jj returned for a feedback appointment to review the findings and recommendations from a psychological evaluation.  Findings from the evaluation, indicated CASS and Unspecified Mood Disorder. Recommendations were reviewed (see evaluation report from 5/1/2025 for full details). The patient was given the opportunity to ask questions and expressed satisfaction with answers provided. Contact information for this provider was given to the patient and he was encouraged to contact the office with any further questions or concerns.     Mary Lou Troncoso PsyD  Clinical Psychologist  [place] Licensed Psychologist  Lic.#[id number]       Tasks Conducted Total Time Spent Associated CPT Codes   Report Writing 120 min.    96131 x 2 units       Chart Review 20 min. 96130 x 1 unit  40868 0 units   Interpretation of Test Data 20 min.    Feedback to Patient/Family Members 30 min.

## 2025-06-03 ENCOUNTER — OFFICE VISIT (OUTPATIENT)
Dept: FAMILY MEDICINE CLINIC | Facility: CLINIC | Age: 25
End: 2025-06-03
Payer: COMMERCIAL

## 2025-06-03 VITALS
HEIGHT: 71 IN | WEIGHT: 271.4 LBS | OXYGEN SATURATION: 98 % | SYSTOLIC BLOOD PRESSURE: 100 MMHG | TEMPERATURE: 98.5 F | DIASTOLIC BLOOD PRESSURE: 60 MMHG | HEART RATE: 75 BPM | BODY MASS INDEX: 37.99 KG/M2 | RESPIRATION RATE: 16 BRPM

## 2025-06-03 DIAGNOSIS — J30.2 SEASONAL ALLERGIC RHINITIS, UNSPECIFIED TRIGGER: ICD-10-CM

## 2025-06-03 DIAGNOSIS — J06.9 UPPER RESPIRATORY TRACT INFECTION, UNSPECIFIED TYPE: Primary | ICD-10-CM

## 2025-06-03 DIAGNOSIS — H69.93 DYSFUNCTION OF BOTH EUSTACHIAN TUBES: ICD-10-CM

## 2025-06-03 LAB
SARS-COV-2 AG UPPER RESP QL IA: NEGATIVE
VALID CONTROL: NORMAL

## 2025-06-03 PROCEDURE — 99214 OFFICE O/P EST MOD 30 MIN: CPT | Performed by: FAMILY MEDICINE

## 2025-06-03 PROCEDURE — 87811 SARS-COV-2 COVID19 W/OPTIC: CPT | Performed by: FAMILY MEDICINE

## 2025-06-03 RX ORDER — LORATADINE 10 MG/1
10 TABLET ORAL DAILY
Qty: 100 TABLET | Refills: 1 | Status: SHIPPED | OUTPATIENT
Start: 2025-06-03

## 2025-06-03 RX ORDER — FLUTICASONE PROPIONATE 50 MCG
2 SPRAY, SUSPENSION (ML) NASAL DAILY
Qty: 15.8 ML | Refills: 5 | Status: SHIPPED | OUTPATIENT
Start: 2025-06-03

## 2025-06-03 RX ORDER — METHYLPREDNISOLONE 4 MG/1
TABLET ORAL
Qty: 21 EACH | Refills: 0 | Status: SHIPPED | OUTPATIENT
Start: 2025-06-03 | End: 2025-06-09

## 2025-06-03 RX ORDER — MONTELUKAST SODIUM 10 MG/1
10 TABLET ORAL
Qty: 100 TABLET | Refills: 1 | Status: SHIPPED | OUTPATIENT
Start: 2025-06-03

## 2025-06-03 NOTE — PROGRESS NOTES
Name: Edgardo Jj      : 2000      MRN: [id number]  Encounter Provider: Rohith Zapata MD  Encounter Date: 6/3/2025   Encounter department: [facility]  :  Assessment & Plan  Upper respiratory tract infection, unspecified type      Orders:  •  POCT Rapid Covid Ag    Seasonal allergic rhinitis, unspecified trigger  Likely allergens.  Recommend Claritin 10 mg, restart Singulair.  Restart Flonase.  Follow-up in the future.  Would also use Medrol Dosepak given the significance of his symptoms.  Orders:  •  methylPREDNISolone 4 MG tablet therapy pack; Use as directed on package  •  loratadine (CLARITIN) 10 mg tablet; Take 1 tablet (10 mg total) by mouth in the morning.  •  montelukast (SINGULAIR) 10 mg tablet; Take 1 tablet (10 mg total) by mouth daily at bedtime  •  fluticasone (FLONASE) 50 mcg/act nasal spray; 2 sprays into each nostril in the morning.    Dysfunction of both eustachian tubes  Likely some aspect of eustachian tube dysfunction.  Treatment for allergies with this.    Orders:  •  montelukast (SINGULAIR) 10 mg tablet; Take 1 tablet (10 mg total) by mouth daily at bedtime          1. Upper respiratory tract infection, unspecified type  Comments:  Some URI symptoms.  Treat symptomatically at this point.  Orders:  -     POCT Rapid Covid Ag  2. Seasonal allergic rhinitis, unspecified trigger  Assessment & Plan:  Likely allergens.  Recommend Claritin 10 mg, restart Singulair.  Restart Flonase.  Follow-up in the future.  Would also use Medrol Dosepak given the significance of his symptoms.  Orders:  •  methylPREDNISolone 4 MG tablet therapy pack; Use as directed on package  •  loratadine (CLARITIN) 10 mg tablet; Take 1 tablet (10 mg total) by mouth in the morning.  •  montelukast (SINGULAIR) 10 mg tablet; Take 1 tablet (10 mg total) by mouth daily at bedtime  •  fluticasone (FLONASE) 50 mcg/act nasal spray; 2 sprays into each nostril in the morning.    Orders:  -      "methylPREDNISolone 4 MG tablet therapy pack; Use as directed on package  -     loratadine (CLARITIN) 10 mg tablet; Take 1 tablet (10 mg total) by mouth in the morning.  -     montelukast (SINGULAIR) 10 mg tablet; Take 1 tablet (10 mg total) by mouth daily at bedtime  -     fluticasone (FLONASE) 50 mcg/act nasal spray; 2 sprays into each nostril in the morning.  3. Dysfunction of both eustachian tubes  Comments:  Likely due to allergies.  Add Singulair.  Orders:  -     montelukast (SINGULAIR) 10 mg tablet; Take 1 tablet (10 mg total) by mouth daily at bedtime      Chief Complaint   Patient presents with   • Cold Like Symptoms     Patient in office for congestion, cough, sore throat, nausea and body aches on set since Wednesday. Patient has tried otc mucinex and has no relief.            History of Present Illness   Please see chief complaint.  Symptoms since last Wednesday.    No tooth or face pain.  Postnasal drip noted.        Review of Systems   Constitutional:  Negative for appetite change, chills, diaphoresis, fatigue and fever.   HENT:  Positive for congestion and sore throat. Negative for ear pain, facial swelling, postnasal drip, rhinorrhea, sinus pressure, sinus pain, trouble swallowing and voice change.    Respiratory:  Positive for cough. Negative for chest tightness, shortness of breath and wheezing.    Cardiovascular:  Negative for chest pain.   Gastrointestinal:  Positive for nausea. Negative for abdominal pain, diarrhea and vomiting.   Musculoskeletal:  Negative for myalgias.   Neurological:  Negative for headaches.       Objective   /60 (BP Location: Left arm, Patient Position: Sitting, Cuff Size: Adult)   Pulse 75   Temp 98.5 °F (36.9 °C) (Temporal)   Resp 16   Ht 5' 11\" (1.803 m)   Wt 123 kg (271 lb 6.4 oz)   SpO2 98%   BMI 37.85 kg/m²      Physical Exam  Vitals and nursing note reviewed.   Constitutional:       Appearance: Normal appearance. He is well-developed.   HENT:      Head: " Normocephalic and atraumatic. Right periorbital erythema and left periorbital erythema present.      Comments: Allergic shiners     Right Ear: Hearing, ear canal and external ear normal. Tympanic membrane is retracted.      Left Ear: Hearing, ear canal and external ear normal. Tympanic membrane is retracted.     Cardiovascular:      Rate and Rhythm: Normal rate and regular rhythm.      Pulses:           Carotid pulses are 2+ on the right side and 2+ on the left side.     Heart sounds: Normal heart sounds. No murmur heard.     No friction rub. No gallop.   Pulmonary:      Effort: Pulmonary effort is normal. No respiratory distress.      Breath sounds: Normal breath sounds. No wheezing or rales.     Musculoskeletal:      Cervical back: Normal range of motion and neck supple.   Lymphadenopathy:      Cervical: No cervical adenopathy.     Neurological:      Mental Status: He is alert.

## 2025-06-03 NOTE — PATIENT INSTRUCTIONS
1. Upper respiratory tract infection, unspecified type  Comments:  Some URI symptoms.  Treat symptomatically at this point.  Orders:  -     POCT Rapid Covid Ag  2. Seasonal allergic rhinitis, unspecified trigger  Assessment & Plan:  Likely allergens.  Recommend Claritin 10 mg, restart Singulair.  Restart Flonase.  Follow-up in the future.  Would also use Medrol Dosepak given the significance of his symptoms.  Orders:    methylPREDNISolone 4 MG tablet therapy pack; Use as directed on package    loratadine (CLARITIN) 10 mg tablet; Take 1 tablet (10 mg total) by mouth in the morning.    montelukast (SINGULAIR) 10 mg tablet; Take 1 tablet (10 mg total) by mouth daily at bedtime    fluticasone (FLONASE) 50 mcg/act nasal spray; 2 sprays into each nostril in the morning.    Orders:  -     methylPREDNISolone 4 MG tablet therapy pack; Use as directed on package  -     loratadine (CLARITIN) 10 mg tablet; Take 1 tablet (10 mg total) by mouth in the morning.  -     montelukast (SINGULAIR) 10 mg tablet; Take 1 tablet (10 mg total) by mouth daily at bedtime  -     fluticasone (FLONASE) 50 mcg/act nasal spray; 2 sprays into each nostril in the morning.  3. Dysfunction of both eustachian tubes  Comments:  Likely due to allergies.  Add Singulair.  Orders:  -     montelukast (SINGULAIR) 10 mg tablet; Take 1 tablet (10 mg total) by mouth daily at bedtime      COVID 19 Instructions    Edgardo Jj was advised to limit contact with others to essential tasks such as getting food, medications, and medical care.    Proper handwashing reviewed, and Hand sanitzer when washing is not available.    If the patient develops symptoms of COVID 19, the patient should call the office as soon as possible.    It is strongly recommended that Flu Vaccinations be obtained.      Virtual Visits:  You should get a text message when the provider is ready to see you.  Click on the link in the text message, and the call should start.  Make sure you  allow camera and microphone access.  This is HIPPA compliant, and secure.  Also, you could access this visit from Spacious App in the [facility] gwen.      If you have not already done so, get immunized to COVID 19.      We are committed to getting you vaccinated as soon as possible and will be closely following [facility] and [facility] guidelines as they are released and revised.  Please refer to our COVID-19 vaccine webpage for the most up to date information on the vaccine and our distribution efforts.    This site will also have the most up to date recommendations for COVID booster vaccine.    [email or web address]    Call [phone] ([phone]), option 7    You can also visit https://www.vaccines.gov/ to find vaccines in your area.    OUR LOCATION:    [facility]  [address] [place] [address], [address]  [place], [place], [address] [phone]  Fax: [phone]    Lab services, Rheumatology, and OB/GYN are at this location as well.

## 2025-06-03 NOTE — ASSESSMENT & PLAN NOTE
Likely allergens.  Recommend Claritin 10 mg, restart Singulair.  Restart Flonase.  Follow-up in the future.  Would also use Medrol Dosepak given the significance of his symptoms.  Orders:  •  methylPREDNISolone 4 MG tablet therapy pack; Use as directed on package  •  loratadine (CLARITIN) 10 mg tablet; Take 1 tablet (10 mg total) by mouth in the morning.  •  montelukast (SINGULAIR) 10 mg tablet; Take 1 tablet (10 mg total) by mouth daily at bedtime  •  fluticasone (FLONASE) 50 mcg/act nasal spray; 2 sprays into each nostril in the morning.

## 2025-06-09 ENCOUNTER — OFFICE VISIT (OUTPATIENT)
Dept: FAMILY MEDICINE CLINIC | Facility: CLINIC | Age: 25
End: 2025-06-09

## 2025-06-09 VITALS
BODY MASS INDEX: 37.94 KG/M2 | DIASTOLIC BLOOD PRESSURE: 70 MMHG | SYSTOLIC BLOOD PRESSURE: 118 MMHG | HEART RATE: 92 BPM | OXYGEN SATURATION: 98 % | HEIGHT: 71 IN | WEIGHT: 271 LBS

## 2025-06-09 DIAGNOSIS — S10.91XA ABRASION, NECK W/O INFECTION: ICD-10-CM

## 2025-06-09 DIAGNOSIS — M79.671 PAIN IN BOTH FEET: ICD-10-CM

## 2025-06-09 DIAGNOSIS — S30.1XXA CONTUSION OF ABDOMINAL WALL, INITIAL ENCOUNTER: ICD-10-CM

## 2025-06-09 DIAGNOSIS — Z23 ENCOUNTER FOR IMMUNIZATION: ICD-10-CM
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
## 2025-06-09 DIAGNOSIS — R07.1 CHEST PAIN ON BREATHING: ICD-10-CM

## 2025-06-09 DIAGNOSIS — S10.93XA CONTUSION OF NECK, INITIAL ENCOUNTER: ICD-10-CM

## 2025-06-09 DIAGNOSIS — M79.672 PAIN IN BOTH FEET: ICD-10-CM

## 2025-06-09 DIAGNOSIS — M54.2 NECK PAIN: ICD-10-CM

## 2025-06-09 RX ORDER — NAPROXEN 500 MG/1
500 TABLET ORAL 2 TIMES DAILY WITH MEALS
Qty: 30 TABLET | Refills: 1 | Status: SHIPPED | OUTPATIENT
Start: 2025-06-09
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
## 2025-06-09 NOTE — PATIENT INSTRUCTIONS
Continue over-the-counter Aleve  May use naproxen sodium 500 mg twice daily with food  Complete x-ray as ordered  Patient may use ice to area 15 minutes 3-4 times daily  Recheck 1 week  If worsening call office

## 2025-06-10 ENCOUNTER — APPOINTMENT (OUTPATIENT)
Dept: RADIOLOGY | Facility: MEDICAL CENTER | Age: 25
End: 2025-06-10
Payer: COMMERCIAL

## 2025-06-10 DIAGNOSIS — S10.93XA CONTUSION OF NECK, INITIAL ENCOUNTER: ICD-10-CM
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
## 2025-06-10 DIAGNOSIS — S10.91XA ABRASION, NECK W/O INFECTION: ICD-10-CM

## 2025-06-10 DIAGNOSIS — M54.2 NECK PAIN: ICD-10-CM

## 2025-06-10 DIAGNOSIS — R07.1 CHEST PAIN ON BREATHING: ICD-10-CM

## 2025-06-10 DIAGNOSIS — M79.672 PAIN IN BOTH FEET: ICD-10-CM

## 2025-06-10 DIAGNOSIS — S30.1XXA CONTUSION OF ABDOMINAL WALL, INITIAL ENCOUNTER: ICD-10-CM

## 2025-06-10 DIAGNOSIS — M79.671 PAIN IN BOTH FEET: ICD-10-CM

## 2025-06-10 PROCEDURE — 72040 X-RAY EXAM NECK SPINE 2-3 VW: CPT

## 2025-06-10 PROCEDURE — 71046 X-RAY EXAM CHEST 2 VIEWS: CPT

## 2025-06-10 PROCEDURE — 73630 X-RAY EXAM OF FOOT: CPT

## 2025-06-11 ENCOUNTER — RESULTS FOLLOW-UP (OUTPATIENT)
Dept: FAMILY MEDICINE CLINIC | Facility: CLINIC | Age: 25
End: 2025-06-11

## 2025-06-11 ENCOUNTER — TELEPHONE (OUTPATIENT)
Dept: FAMILY MEDICINE CLINIC | Facility: CLINIC | Age: 25
End: 2025-06-11
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
## 2025-06-26 DIAGNOSIS — J30.2 SEASONAL ALLERGIC RHINITIS, UNSPECIFIED TRIGGER: ICD-10-CM

## 2025-06-27 RX ORDER — FLUTICASONE PROPIONATE 50 MCG
2 SPRAY, SUSPENSION (ML) NASAL DAILY
Qty: 48 ML | Refills: 1 | Status: SHIPPED | OUTPATIENT
Start: 2025-06-27

## 2025-07-17 ENCOUNTER — OFFICE VISIT (OUTPATIENT)
Dept: FAMILY MEDICINE CLINIC | Facility: CLINIC | Age: 25
End: 2025-07-17
Payer: COMMERCIAL

## 2025-07-17 ENCOUNTER — TRANSITIONAL CARE MANAGEMENT (OUTPATIENT)
Dept: FAMILY MEDICINE CLINIC | Facility: CLINIC | Age: 25
End: 2025-07-17

## 2025-07-17 VITALS
OXYGEN SATURATION: 98 % | HEIGHT: 71 IN | DIASTOLIC BLOOD PRESSURE: 60 MMHG | WEIGHT: 275 LBS | BODY MASS INDEX: 38.5 KG/M2 | HEART RATE: 94 BPM | SYSTOLIC BLOOD PRESSURE: 126 MMHG

## 2025-07-17 DIAGNOSIS — S22.000D COMPRESSION FRACTURE OF THORACIC VERTEBRA WITH ROUTINE HEALING, UNSPECIFIED THORACIC VERTEBRAL LEVEL, SUBSEQUENT ENCOUNTER: ICD-10-CM

## 2025-07-17 DIAGNOSIS — S32.009D CLOSED FRACTURE OF TRANSVERSE PROCESS OF LUMBAR VERTEBRA WITH ROUTINE HEALING: ICD-10-CM
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
## 2025-07-17 DIAGNOSIS — R56.9 SEIZURE (HCC): Primary | ICD-10-CM

## 2025-07-17 DIAGNOSIS — M54.6 THORACOLUMBAR BACK PAIN: ICD-10-CM

## 2025-07-17 DIAGNOSIS — R79.89 ELEVATED TROPONIN: ICD-10-CM

## 2025-07-17 DIAGNOSIS — M54.50 THORACOLUMBAR BACK PAIN: ICD-10-CM

## 2025-07-17 DIAGNOSIS — F33.9 RECURRENT MAJOR DEPRESSIVE DISORDER, REMISSION STATUS UNSPECIFIED (HCC): ICD-10-CM

## 2025-07-17 PROBLEM — S22.000A THORACIC COMPRESSION FRACTURE (HCC): Status: ACTIVE | Noted: 2025-07-16

## 2025-07-17 PROBLEM — S22.000A THORACIC COMPRESSION FRACTURE (HCC): Status: RESOLVED | Noted: 2025-07-16 | Resolved: 2025-07-17

## 2025-07-17 PROCEDURE — 99496 TRANSJ CARE MGMT HIGH F2F 7D: CPT | Performed by: FAMILY MEDICINE

## 2025-07-17 RX ORDER — DICLOFENAC POTASSIUM 50 MG/1
TABLET, FILM COATED ORAL
Qty: 30 TABLET | Refills: 1 | Status: SHIPPED | OUTPATIENT
Start: 2025-07-17

## 2025-07-17 NOTE — ASSESSMENT & PLAN NOTE
Patient has compression deformities T7, T11, and T12.  Patient is wearing back brace, refer to orthopedics  Orders:  •  Ambulatory Referral to Orthopedic Surgery; Future  •  Ambulatory Referral to Physical Therapy; Future

## 2025-07-17 NOTE — ASSESSMENT & PLAN NOTE
L2 transverse process fracture.  Patient is wearing back brace, refer to orthopedics  Orders:  •  Ambulatory Referral to Orthopedic Surgery; Future  •  Ambulatory Referral to Physical Therapy; Future
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
## 2025-07-17 NOTE — ASSESSMENT & PLAN NOTE
Wellbutrin and Lexapro are held secondary to lowering seizure threshold at the present time patient is to follow-up with his behavioral health specialist

## 2025-07-17 NOTE — ASSESSMENT & PLAN NOTE
Status post hospitalization [facility]  Patient has follow-up with neurology tomorrow  No driving until cleared by neurologist

## 2025-07-17 NOTE — PROGRESS NOTES
Transition of Care Visit:  Name: Edgardo Jj      : 2000      MRN: [id number]  Encounter Provider: Viraj King DO  Encounter Date: 2025   Encounter department: [facility] PRIMARY [facility]ARE  Will have patient recheck with Dr. Zapata in 2 weeks    Assessment & Plan  Seizure (HCC)  Status post hospitalization [facility]  Patient has follow-up with neurology tomorrow  No driving until cleared by neurologist       Fall, subsequent encounter  Consult orthopedics secondary to compression fractures and transverse process fracture  Will consult physical therapy  Orders:  •  Ambulatory Referral to Orthopedic Surgery; Future  •  Ambulatory Referral to Physical Therapy; Future    Closed fracture of transverse process of lumbar vertebra with routine healing  L2 transverse process fracture.  Patient is wearing back brace, refer to orthopedics  Orders:  •  Ambulatory Referral to Orthopedic Surgery; Future  •  Ambulatory Referral to Physical Therapy; Future    Compression fracture of thoracic vertebra with routine healing, unspecified thoracic vertebral level, subsequent encounter  Patient has compression deformities T7, T11, and T12.  Patient is wearing back brace, refer to orthopedics  Orders:  •  Ambulatory Referral to Orthopedic Surgery; Future  •  Ambulatory Referral to Physical Therapy; Future    Recurrent major depressive disorder, remission status unspecified (HCC)  Wellbutrin and Lexapro are held secondary to lowering seizure threshold at the present time patient is to follow-up with his behavioral health specialist       Elevated troponin  Patient to repeat troponin today at lab  Orders:  •  High Sensitivity Troponin I Random; Future    Thoracolumbar back pain  Cataflam as ordered GI side effects discussed patient may also use Tylenol  Orders:  •  diclofenac potassium (CATAFLAM) 50 mg tablet; Take 1 tablet 3 times daily with food as needed for back pain  •  Ambulatory Referral  to Orthopedic Surgery; Future  •  Ambulatory Referral to Physical Therapy; Future         History of Present Illness     Transitional Care Management Review:   Edgardo Jj is a 25 y.o. male here for TCM follow up.     During the TCM phone call patient stated:  TCM Call (since 7/3/2025)     Date and time call was made  7/17/2025  2:29 PM    Hospital care reviewed  Records reviewed    Patient was hospitialized at  [facility]    Date of Admission  07/16/25    Date of discharge  07/17/25    Diagnosis  seizure like activity    Disposition  Home    Current Symptoms  None      TCM Call (since 7/3/2025)     I have advised the patient to call PCP with any new or worsening symptoms  Clare Meléndez MA    Living Arrangements  Family members        Patient was admitted at [facility] from ER for 24-hour observation.  Patient fell in the bathroom had an apparent seizure was taken to ER.  Patient CT head and MRI were normal.  Patient's routine EEG was normal at that time.  Patient's CT chest pelvis revealed an L2 transverse fracture and compression fractures T7 T11 and T12.  In addition patient did have a mildly elevated troponin.  Patient tells me he does have an appointment tomorrow with neurology for follow-up.  At this time he did have a neurology consultation in the ER and they do not have him on any medications for seizure      Review of Systems   Constitutional: Negative.    HENT: Negative.     Eyes: Negative.    Respiratory: Negative.     Cardiovascular: Negative.    Gastrointestinal: Negative.    Endocrine: Negative.    Genitourinary: Negative.    Musculoskeletal:         HPI   Skin: Negative.    Allergic/Immunologic: Negative.    Neurological:         HPI   Hematological: Negative.    Psychiatric/Behavioral:          Patient's antidepressants are held at the present time because a lower seizure threshold     Objective   /60 (BP Location: Right arm, Patient Position: Sitting, Cuff  "Size: Standard)   Pulse 94   Ht 5' 11\" (1.803 m)   Wt 125 kg (275 lb)   SpO2 98%   BMI 38.35 kg/m²     Physical Exam  Vitals and nursing note reviewed.   Constitutional:       Appearance: Normal appearance.   HENT:      Head: Normocephalic and atraumatic.      Mouth/Throat:      Mouth: Mucous membranes are moist.     Eyes:      General: No scleral icterus.      Cardiovascular:      Rate and Rhythm: Normal rate and regular rhythm.      Heart sounds: Normal heart sounds.   Pulmonary:      Effort: Pulmonary effort is normal.      Breath sounds: Normal breath sounds.   Abdominal:      Palpations: Abdomen is soft.      Tenderness: There is no abdominal tenderness.     Musculoskeletal:         General: Tenderness present.      Cervical back: Neck supple. No rigidity or tenderness.      Comments: Positive thoracic lumbar tenderness     Skin:     General: Skin is warm and dry.     Neurological:      General: No focal deficit present.      Mental Status: He is alert and oriented to person, place, and time.      Cranial Nerves: No cranial nerve deficit.     Psychiatric:         Mood and Affect: Mood normal.       Medications have been reviewed by provider in current encounter      "

## 2025-07-18 ENCOUNTER — APPOINTMENT (OUTPATIENT)
Dept: LAB | Facility: MEDICAL CENTER | Age: 25
End: 2025-07-18
Payer: COMMERCIAL

## 2025-07-18 DIAGNOSIS — R79.89 ELEVATED TROPONIN: ICD-10-CM

## 2025-07-18 LAB — CARDIAC TROPONIN I PNL SERPL HS: 29 NG/L (ref 8–18)

## 2025-07-18 PROCEDURE — 84484 ASSAY OF TROPONIN QUANT: CPT

## 2025-07-18 PROCEDURE — 36415 COLL VENOUS BLD VENIPUNCTURE: CPT

## 2025-08-20 ENCOUNTER — OFFICE VISIT (OUTPATIENT)
Dept: FAMILY MEDICINE CLINIC | Facility: CLINIC | Age: 25
End: 2025-08-20
Payer: COMMERCIAL

## 2025-08-20 VITALS
TEMPERATURE: 97.2 F | SYSTOLIC BLOOD PRESSURE: 120 MMHG | RESPIRATION RATE: 16 BRPM | WEIGHT: 264.2 LBS | BODY MASS INDEX: 36.99 KG/M2 | HEIGHT: 71 IN | DIASTOLIC BLOOD PRESSURE: 60 MMHG | HEART RATE: 71 BPM | OXYGEN SATURATION: 97 %

## 2025-08-20 DIAGNOSIS — S22.000A COMPRESSION FRACTURE OF BODY OF THORACIC VERTEBRA (HCC): ICD-10-CM

## 2025-08-20 DIAGNOSIS — F33.1 MODERATE EPISODE OF RECURRENT MAJOR DEPRESSIVE DISORDER (HCC): ICD-10-CM

## 2025-08-20 DIAGNOSIS — S32.009D CLOSED FRACTURE OF TRANSVERSE PROCESS OF LUMBAR VERTEBRA WITH ROUTINE HEALING: ICD-10-CM

## 2025-08-20 DIAGNOSIS — R56.9 SEIZURE (HCC): Primary | ICD-10-CM

## 2025-08-20 PROCEDURE — 99214 OFFICE O/P EST MOD 30 MIN: CPT | Performed by: FAMILY MEDICINE
